# Patient Record
Sex: MALE | Race: WHITE | NOT HISPANIC OR LATINO | Employment: FULL TIME | RURAL
[De-identification: names, ages, dates, MRNs, and addresses within clinical notes are randomized per-mention and may not be internally consistent; named-entity substitution may affect disease eponyms.]

---

## 2021-06-30 ENCOUNTER — OFFICE VISIT (OUTPATIENT)
Dept: FAMILY MEDICINE CLINIC | Facility: CLINIC | Age: 51
End: 2021-06-30

## 2021-06-30 ENCOUNTER — HOSPITAL ENCOUNTER (OUTPATIENT)
Dept: GENERAL RADIOLOGY | Facility: HOSPITAL | Age: 51
Discharge: HOME | End: 2021-06-30
Admitting: NURSE PRACTITIONER

## 2021-06-30 VITALS
HEIGHT: 71 IN | TEMPERATURE: 97.3 F | HEART RATE: 69 BPM | RESPIRATION RATE: 16 BRPM | OXYGEN SATURATION: 96 % | DIASTOLIC BLOOD PRESSURE: 84 MMHG | SYSTOLIC BLOOD PRESSURE: 138 MMHG | WEIGHT: 235.2 LBS | BODY MASS INDEX: 32.93 KG/M2

## 2021-06-30 DIAGNOSIS — R06.02 SHORTNESS OF BREATH: ICD-10-CM

## 2021-06-30 DIAGNOSIS — R07.89 CHEST WALL PAIN: Primary | ICD-10-CM

## 2021-06-30 DIAGNOSIS — H60.331 ACUTE SWIMMER'S EAR OF RIGHT SIDE: ICD-10-CM

## 2021-06-30 DIAGNOSIS — R07.81 RIB PAIN ON RIGHT SIDE: ICD-10-CM

## 2021-06-30 DIAGNOSIS — R53.83 OTHER FATIGUE: ICD-10-CM

## 2021-06-30 DIAGNOSIS — C44.311 BASAL CELL CARCINOMA OF NOSE: ICD-10-CM

## 2021-06-30 DIAGNOSIS — R07.89 CHEST WALL PAIN: ICD-10-CM

## 2021-06-30 DIAGNOSIS — R10.11 RIGHT UPPER QUADRANT PAIN: ICD-10-CM

## 2021-06-30 PROBLEM — E78.5 HYPERLIPIDEMIA: Status: ACTIVE | Noted: 2021-06-30

## 2021-06-30 LAB
BILIRUB BLD-MCNC: NEGATIVE MG/DL
CLARITY, POC: CLEAR
COLOR UR: YELLOW
GLUCOSE UR STRIP-MCNC: NEGATIVE MG/DL
KETONES UR QL: NEGATIVE
LEUKOCYTE EST, POC: NEGATIVE
NITRITE UR-MCNC: NEGATIVE MG/ML
PH UR: 5 [PH] (ref 5–8)
PROT UR STRIP-MCNC: NEGATIVE MG/DL
RBC # UR STRIP: NEGATIVE /UL
SP GR UR: 1.03 (ref 1–1.03)
UROBILINOGEN UR QL: NORMAL

## 2021-06-30 PROCEDURE — 93000 ELECTROCARDIOGRAM COMPLETE: CPT | Performed by: NURSE PRACTITIONER

## 2021-06-30 PROCEDURE — 71101 X-RAY EXAM UNILAT RIBS/CHEST: CPT

## 2021-06-30 PROCEDURE — 99204 OFFICE O/P NEW MOD 45 MIN: CPT | Performed by: NURSE PRACTITIONER

## 2021-06-30 PROCEDURE — 81003 URINALYSIS AUTO W/O SCOPE: CPT | Performed by: NURSE PRACTITIONER

## 2021-06-30 RX ORDER — COLISTIN SULFATE, NEOMYCIN SULFATE, THONZONIUM BROMIDE AND HYDROCORTISONE ACETATE 3; 3.3; .5; 1 MG/ML; MG/ML; MG/ML; MG/ML
3 SUSPENSION AURICULAR (OTIC) 4 TIMES DAILY
Qty: 10 ML | Refills: 0 | Status: SHIPPED | OUTPATIENT
Start: 2021-06-30 | End: 2021-07-03

## 2021-06-30 RX ORDER — OMEPRAZOLE 40 MG/1
40 CAPSULE, DELAYED RELEASE ORAL DAILY
Qty: 30 CAPSULE | Refills: 0 | Status: SHIPPED | OUTPATIENT
Start: 2021-06-30 | End: 2021-07-22

## 2021-06-30 NOTE — PROGRESS NOTES
Chief Complaint  Chest Pain    Subjective          Mau Thompson presents to [facility] for     History of Present Illness      Patient reports 1 year of right lower rib cage pain.  He reports he has this when he exercises on a regular basis.  He notices it on the treadmill walking about a year ago.  Over the last few months his been almost a constant pain or pressure in the right lower rib cage.  He also feels a fullness in his epigastric area.  He has had no diaphoresis or radiation to his arms.  He does occasionally notice some shortness of breath.  He stopped smoking many years ago after being a smoker for 25 years.  He does not drink much alcohol except from the weekends.  He notices no correlation to food.    Patient has a history of degenerative disc disease lumbar.  He also has some cervical disc problems as well.  He has not been seen by provider in some time.    Secondarily has had some bleeding and irritation from his right ear canal on and off for the last week.  He has been swimming locally.    Patient had a basal cell cancer removed from his nose by a dermatologist in [place] approximately 1 month ago.  He says he has no follow-up.  We discussed routine follow-ups with dermatology.          Mau Thompson  has a past medical history of DDD (degenerative disc disease), cervical and DDD (degenerative disc disease), lumbar.      Review of Systems   Constitutional: Positive for fatigue.   HENT: Positive for ear discharge. Negative for ear pain, sinus pain and sore throat.    Eyes: Negative for pain.   Respiratory: Positive for shortness of breath. Negative for cough.    Cardiovascular: Positive for chest pain.   Gastrointestinal: Negative for abdominal pain, diarrhea and nausea.   Endocrine: Negative for polyuria.   Genitourinary: Negative for decreased urine volume and dysuria.   Musculoskeletal: Negative for arthralgias.   Skin: Negative for rash.   Allergic/Immunologic:  "Negative for environmental allergies.   Neurological: Negative for dizziness, numbness and headaches.   Hematological: Does not bruise/bleed easily.   Psychiatric/Behavioral: The patient is not nervous/anxious.         Objective       Current Outpatient Medications:   •  neomycin-colistin-hydrocortisone-thonzonium (Cortisporin-TC) 3.3-3-10-0.5 MG/ML otic suspension, Administer 3 drops to the right ear 4 (Four) Times a Day for 7 days., Disp: 10 mL, Rfl: 0  •  omeprazole (priLOSEC) 40 MG capsule, Take 1 capsule by mouth Daily., Disp: 30 capsule, Rfl: 0    Vital Signs:      /84 (BP Location: Left arm, Patient Position: Sitting, Cuff Size: Large Adult)   Pulse 69   Temp 97.3 °F (36.3 °C) (Infrared)   Resp 16   Ht 180.3 cm (71\")   Wt 107 kg (235 lb 3.2 oz)   SpO2 96%   BMI 32.80 kg/m²     Vitals:    06/30/21 0846   BP: 138/84   BP Location: Left arm   Patient Position: Sitting   Cuff Size: Large Adult   Pulse: 69   Resp: 16   Temp: 97.3 °F (36.3 °C)   TempSrc: Infrared   SpO2: 96%   Weight: 107 kg (235 lb 3.2 oz)   Height: 180.3 cm (71\")      Physical Exam  Vitals and nursing note reviewed.   Constitutional:       General: He is not in acute distress.     Appearance: Normal appearance. He is well-developed and normal weight.   HENT:      Head: Normocephalic and atraumatic.      Right Ear: Tympanic membrane and ear canal normal.      Left Ear: Tympanic membrane, ear canal and external ear normal.      Ears:      Comments: Scab and exudate internal canal.     Nose: Nose normal.      Mouth/Throat:      Mouth: Mucous membranes are moist.      Dentition: Normal dentition.      Tongue: No lesions.      Pharynx: Uvula midline.   Eyes:      Conjunctiva/sclera: Conjunctivae normal.      Pupils: Pupils are equal, round, and reactive to light.   Cardiovascular:      Rate and Rhythm: Regular rhythm.      Heart sounds: Normal heart sounds. No murmur heard.   No friction rub. No gallop.    Pulmonary:      Effort: " Pulmonary effort is normal.      Breath sounds: Normal breath sounds. No wheezing or rhonchi.   Abdominal:      General: Bowel sounds are normal. There is no distension.      Palpations: Abdomen is soft.      Tenderness: There is no abdominal tenderness. Negative signs include Centeno's sign.      Comments: Questionable mild liver enlargement   Musculoskeletal:         General: Normal range of motion.      Cervical back: Normal range of motion and neck supple.   Skin:     General: Skin is warm and dry.   Neurological:      General: No focal deficit present.      Mental Status: He is alert.   Psychiatric:         Mood and Affect: Mood normal.         Behavior: Behavior normal.        Result Review :            ECG 12 Lead    Date/Time: 6/30/2021 9:24 AM  Performed by: Sandra Sanchez APRN  Authorized by: Sandra Sanchez APRN   Comparison: not compared with previous ECG   Rhythm: sinus rhythm  Rate: normal  Conduction: conduction normal  ST Segments: ST segments normal  QRS axis: normal  Other: no other findings    Clinical impression: normal ECG             PHQ-9 Depression Screening  Little interest or pleasure in doing things? 0   Feeling down, depressed, or hopeless? 0   Trouble falling or staying asleep, or sleeping too much?     Feeling tired or having little energy?     Poor appetite or overeating?     Feeling bad about yourself - or that you are a failure or have let yourself or your family down?     Trouble concentrating on things, such as reading the newspaper or watching television?     Moving or speaking so slowly that other people could have noticed? Or the opposite - being so fidgety or restless that you have been moving around a lot more than usual?     Thoughts that you would be better off dead, or of hurting yourself in some way?     PHQ-9 Total Score 0   If you checked off any problems, how difficult have these problems made it for you to do your work, take care of things at home, or get along  with other people?             Assessment and Plan    Diagnoses and all orders for this visit:    1. Chest wall pain (Primary)  Comments:  X-ray today.  Discussed using omeprazole.  May need additional work-up follow-up 3 weeks.  EKG normal  Orders:  -     Comprehensive Metabolic Panel  -     XR Ribs Right With PA Chest; Future  -     ECG 12 Lead    2. Shortness of breath  Comments:  On exertion with fullness.  Consider hiatal hernia versus GERD  Orders:  -     Comprehensive Metabolic Panel  -     TSH  -     CBC & Differential  -     ECG 12 Lead    3. Other fatigue  Comments:  Labs today will call with results  Orders:  -     Comprehensive Metabolic Panel  -     TSH  -     CBC & Differential    4. Rib pain on right side  -     XR Ribs Right With PA Chest; Future    5. Basal cell carcinoma of nose  Comments:  Annual follow-up    6. Acute swimmer's ear of right side  Comments:  Recheck here 2 to 3 weeks    Other orders  -     Cancel: XR Ribs Bilateral 4+ View With PA Chest; Future  -     neomycin-colistin-hydrocortisone-thonzonium (Cortisporin-TC) 3.3-3-10-0.5 MG/ML otic suspension; Administer 3 drops to the right ear 4 (Four) Times a Day for 7 days.  Dispense: 10 mL; Refill: 0  -     omeprazole (priLOSEC) 40 MG capsule; Take 1 capsule by mouth Daily.  Dispense: 30 capsule; Refill: 0         Problem List Items Addressed This Visit        Active Problems    Basal cell carcinoma of nose      Other Visit Diagnoses     Chest wall pain    -  Primary    X-ray today.  Discussed using omeprazole.  May need additional work-up follow-up 3 weeks.  EKG normal    Relevant Orders    Comprehensive Metabolic Panel    XR Ribs Right With PA Chest    ECG 12 Lead (Completed)    Shortness of breath        On exertion with fullness.  Consider hiatal hernia versus GERD    Relevant Orders    Comprehensive Metabolic Panel    TSH    CBC & Differential    ECG 12 Lead (Completed)    Other fatigue        Labs today will call with results     Relevant Orders    Comprehensive Metabolic Panel    TSH    CBC & Differential    Rib pain on right side        Relevant Orders    XR Ribs Right With PA Chest    Acute swimmer's ear of right side        Recheck here 2 to 3 weeks          Follow Up   Return in about 19 days (around 7/19/2021) for Recheck  ear recheck, rib pain.  Patient was given instructions and counseling regarding his condition or for health maintenance advice. Please see specific information pulled into the AVS if appropriate.

## 2021-07-01 LAB
ALBUMIN SERPL-MCNC: 4.5 G/DL (ref 4–5)
ALBUMIN/GLOB SERPL: 1.8 {RATIO} (ref 1.2–2.2)
ALP SERPL-CCNC: 128 IU/L (ref 48–121)
ALT SERPL-CCNC: 33 IU/L (ref 0–44)
AST SERPL-CCNC: 21 IU/L (ref 0–40)
BASOPHILS # BLD AUTO: 0.1 X10E3/UL (ref 0–0.2)
BASOPHILS NFR BLD AUTO: 2 %
BILIRUB SERPL-MCNC: 0.3 MG/DL (ref 0–1.2)
BUN SERPL-MCNC: 15 MG/DL (ref 6–24)
BUN/CREAT SERPL: 16 (ref 9–20)
CALCIUM SERPL-MCNC: 9 MG/DL (ref 8.7–10.2)
CHLORIDE SERPL-SCNC: 107 MMOL/L (ref 96–106)
CO2 SERPL-SCNC: 22 MMOL/L (ref 20–29)
CREAT SERPL-MCNC: 0.92 MG/DL (ref 0.76–1.27)
EOSINOPHIL # BLD AUTO: 0.2 X10E3/UL (ref 0–0.4)
EOSINOPHIL NFR BLD AUTO: 3 %
ERYTHROCYTE [DISTWIDTH] IN BLOOD BY AUTOMATED COUNT: 12.9 % (ref 11.6–15.4)
GLOBULIN SER CALC-MCNC: 2.5 G/DL (ref 1.5–4.5)
GLUCOSE SERPL-MCNC: 108 MG/DL (ref 65–99)
HCT VFR BLD AUTO: 47.9 % (ref 37.5–51)
HGB BLD-MCNC: 16.2 G/DL (ref 13–17.7)
IMM GRANULOCYTES # BLD AUTO: 0 X10E3/UL (ref 0–0.1)
IMM GRANULOCYTES NFR BLD AUTO: 0 %
LYMPHOCYTES # BLD AUTO: 1.8 X10E3/UL (ref 0.7–3.1)
LYMPHOCYTES NFR BLD AUTO: 29 %
MCH RBC QN AUTO: 30.6 PG (ref 26.6–33)
MCHC RBC AUTO-ENTMCNC: 33.8 G/DL (ref 31.5–35.7)
MCV RBC AUTO: 90 FL (ref 79–97)
MONOCYTES # BLD AUTO: 0.7 X10E3/UL (ref 0.1–0.9)
MONOCYTES NFR BLD AUTO: 10 %
NEUTROPHILS # BLD AUTO: 3.6 X10E3/UL (ref 1.4–7)
NEUTROPHILS NFR BLD AUTO: 56 %
PLATELET # BLD AUTO: 254 X10E3/UL (ref 150–450)
POTASSIUM SERPL-SCNC: 4.3 MMOL/L (ref 3.5–5.2)
PROT SERPL-MCNC: 7 G/DL (ref 6–8.5)
RBC # BLD AUTO: 5.3 X10E6/UL (ref 4.14–5.8)
SODIUM SERPL-SCNC: 142 MMOL/L (ref 134–144)
TSH SERPL DL<=0.005 MIU/L-ACNC: 2.6 UIU/ML (ref 0.45–4.5)
WBC # BLD AUTO: 6.4 X10E3/UL (ref 3.4–10.8)

## 2021-07-02 ENCOUNTER — TELEPHONE (OUTPATIENT)
Dept: FAMILY MEDICINE CLINIC | Facility: CLINIC | Age: 51
End: 2021-07-02

## 2021-07-02 LAB
BACTERIA UR CULT: NO GROWTH
BACTERIA UR CULT: NORMAL

## 2021-07-02 NOTE — TELEPHONE ENCOUNTER
Received a fax from pharmacy that the eardrops you sent for patient were too expensive and he was requesting an alternative.

## 2021-07-03 RX ORDER — OFLOXACIN 3 MG/ML
5 SOLUTION AURICULAR (OTIC) DAILY
Qty: 2 ML | Refills: 0 | Status: SHIPPED | OUTPATIENT
Start: 2021-07-03 | End: 2021-07-10

## 2021-07-08 ENCOUNTER — HOSPITAL ENCOUNTER (OUTPATIENT)
Dept: ULTRASOUND IMAGING | Facility: HOSPITAL | Age: 51
Discharge: HOME | End: 2021-07-08
Admitting: NURSE PRACTITIONER

## 2021-07-08 DIAGNOSIS — R10.11 RIGHT UPPER QUADRANT PAIN: ICD-10-CM

## 2021-07-08 PROCEDURE — 76705 ECHO EXAM OF ABDOMEN: CPT

## 2021-07-09 ENCOUNTER — TELEPHONE (OUTPATIENT)
Dept: FAMILY MEDICINE CLINIC | Facility: CLINIC | Age: 51
End: 2021-07-09

## 2021-07-09 DIAGNOSIS — K76.0 FATTY LIVER: ICD-10-CM

## 2021-07-09 DIAGNOSIS — R93.2 ABNORMAL GALLBLADDER ULTRASOUND: ICD-10-CM

## 2021-07-09 DIAGNOSIS — R10.11 RIGHT UPPER QUADRANT PAIN: Primary | ICD-10-CM

## 2021-07-09 DIAGNOSIS — R16.0 ENLARGED LIVER: ICD-10-CM

## 2021-07-12 NOTE — TELEPHONE ENCOUNTER
Please inform pt an enlarged and fatty liver.  Also there was some indication that the gallbladder wall was thickened.  There is no stone seen on the ultrasound.  Would like to do a CT scan of his abdomen and pelvis to follow-up on these findings as well as his complaint of pain in that area.  He has a follow-up with me already on July 21.  If you get these tests done we can make a decision about next steps.

## 2021-07-13 ENCOUNTER — TELEPHONE (OUTPATIENT)
Dept: FAMILY MEDICINE CLINIC | Facility: CLINIC | Age: 51
End: 2021-07-13

## 2021-07-13 NOTE — TELEPHONE ENCOUNTER
Patients spouse called requesting patient get scheduled for a CT as soon as possible. Patient is having increasing pain.

## 2021-07-14 PROBLEM — D31.30 NEVUS OF CHOROID: Status: ACTIVE | Noted: 2020-10-01

## 2021-07-21 ENCOUNTER — OFFICE VISIT (OUTPATIENT)
Dept: FAMILY MEDICINE CLINIC | Facility: CLINIC | Age: 51
End: 2021-07-21

## 2021-07-21 VITALS
TEMPERATURE: 97.9 F | SYSTOLIC BLOOD PRESSURE: 134 MMHG | DIASTOLIC BLOOD PRESSURE: 85 MMHG | BODY MASS INDEX: 32.93 KG/M2 | HEART RATE: 72 BPM | RESPIRATION RATE: 16 BRPM | HEIGHT: 71 IN | OXYGEN SATURATION: 98 % | WEIGHT: 235.2 LBS

## 2021-07-21 DIAGNOSIS — R93.2 ABNORMAL GALLBLADDER ULTRASOUND: ICD-10-CM

## 2021-07-21 DIAGNOSIS — R10.11 RIGHT UPPER QUADRANT PAIN: Primary | ICD-10-CM

## 2021-07-21 DIAGNOSIS — R16.0 ENLARGED LIVER: ICD-10-CM

## 2021-07-21 DIAGNOSIS — K76.0 FATTY LIVER: ICD-10-CM

## 2021-07-21 DIAGNOSIS — Z12.11 ENCOUNTER FOR SCREENING COLONOSCOPY: ICD-10-CM

## 2021-07-21 PROCEDURE — 99213 OFFICE O/P EST LOW 20 MIN: CPT | Performed by: NURSE PRACTITIONER

## 2021-07-21 NOTE — PROGRESS NOTES
Chief Complaint  Abdominal Pain (Follow up) and Chest Pain    Subjective          Mau Thompson presents to [facility] for follow-up ED visit      History of Present Illness    Patient is here to follow-up after ED visit to [facility] on 7/13/2021 he establish care as a new patient on 6/30/2021.  He had ultrasound of the right upper quadrant which indicated some gallbladder wall thickening.  He had some mild elevation in alkaline phosphatase and fatty liver on ultrasound.  CT scan was ordered to investigate the gallbladder and refer for EGD and colonoscopy.  The intent was to consult with surgery as well.  However patient went to the emergency room with increasing pain and nausea on 7/13/2021.    Previously he had been given omeprazole reports that that never helped any of his pain or problem.  Chest x-ray was normal.  In [facility] ED he had a CT scan of the abdomen and pelvis which is normal.  He also had a CT scan of chest after an elevated D-dimer.  She also had normal labs and EKGs in the emergency room.    He is here with his wife possibly wanting to follow-up with someone in [place] for the EGD and colonoscopy.  We had a discussion about the gallbladder wall and following up with the surgeon as well.  They will discuss it and call back tomorrow.    Mau Thompson  has a past medical history of DDD (degenerative disc disease), cervical and DDD (degenerative disc disease), lumbar.      Review of Systems   Constitutional: Positive for fatigue.   HENT: Negative for ear discharge, ear pain, sinus pain and sore throat.    Eyes: Negative for pain.   Respiratory: Negative for cough and shortness of breath.    Cardiovascular: Positive for chest pain.   Gastrointestinal: Positive for abdominal pain and nausea. Negative for diarrhea.   Endocrine: Negative for polyuria.   Genitourinary: Negative for decreased urine volume and dysuria.   Musculoskeletal: Negative for arthralgias.   Skin:  "Negative for rash.   Allergic/Immunologic: Negative for environmental allergies.   Neurological: Negative for dizziness, numbness and headaches.   Hematological: Does not bruise/bleed easily.   Psychiatric/Behavioral: The patient is not nervous/anxious.         Objective       Current Outpatient Medications:   •  omeprazole (priLOSEC) 40 MG capsule, Take 1 capsule by mouth Daily., Disp: 30 capsule, Rfl: 0    Vital Signs:      /85 (BP Location: Right arm, Patient Position: Sitting, Cuff Size: Large Adult)   Pulse 72   Temp 97.9 °F (36.6 °C) (Infrared)   Resp 16   Ht 180.3 cm (71\")   Wt 107 kg (235 lb 3.2 oz)   SpO2 98%   BMI 32.80 kg/m²     Vitals:    07/21/21 1616   BP: 134/85   BP Location: Right arm   Patient Position: Sitting   Cuff Size: Large Adult   Pulse: 72   Resp: 16   Temp: 97.9 °F (36.6 °C)   TempSrc: Infrared   SpO2: 98%   Weight: 107 kg (235 lb 3.2 oz)   Height: 180.3 cm (71\")      Physical Exam  Vitals and nursing note reviewed.   Constitutional:       Appearance: He is well-developed.   Cardiovascular:      Rate and Rhythm: Normal rate and regular rhythm.      Heart sounds: Normal heart sounds. No murmur heard.   No friction rub. No gallop.    Pulmonary:      Effort: Pulmonary effort is normal.      Breath sounds: Normal breath sounds. No wheezing or rales.   Abdominal:      General: Bowel sounds are normal.      Palpations: Abdomen is soft.      Tenderness: There is no abdominal tenderness.   Skin:     General: Skin is warm and dry.   Neurological:      Mental Status: He is alert.        Result Review :                PHQ-9 Depression Screening  Little interest or pleasure in doing things?     Feeling down, depressed, or hopeless?     Trouble falling or staying asleep, or sleeping too much?     Feeling tired or having little energy?     Poor appetite or overeating?     Feeling bad about yourself - or that you are a failure or have let yourself or your family down?     Trouble " concentrating on things, such as reading the newspaper or watching television?     Moving or speaking so slowly that other people could have noticed? Or the opposite - being so fidgety or restless that you have been moving around a lot more than usual?     Thoughts that you would be better off dead, or of hurting yourself in some way?     PHQ-9 Total Score     If you checked off any problems, how difficult have these problems made it for you to do your work, take care of things at home, or get along with other people?             Assessment and Plan    Diagnoses and all orders for this visit:    1. Right upper quadrant pain (Primary)  -     Ambulatory referral for Screening EGD    2. Abnormal gallbladder ultrasound  -     Ambulatory Referral to General Surgery    3. Enlarged liver  -     Ambulatory referral for Screening EGD    4. Fatty liver    5. Encounter for screening colonoscopy  -     Ambulatory Referral For Screening Colonoscopy         Problem List Items Addressed This Visit     None      Visit Diagnoses     Right upper quadrant pain    -  Primary    Relevant Orders    Ambulatory referral for Screening EGD    Abnormal gallbladder ultrasound        Relevant Orders    Ambulatory Referral to General Surgery    Enlarged liver        Relevant Orders    Ambulatory referral for Screening EGD    Fatty liver        Encounter for screening colonoscopy        Relevant Orders    Ambulatory Referral For Screening Colonoscopy          Follow Up   No follow-ups on file.  Patient was given instructions and counseling regarding his condition or for health maintenance advice. Please see specific information pulled into the AVS if appropriate.

## 2021-07-22 RX ORDER — OMEPRAZOLE 40 MG/1
CAPSULE, DELAYED RELEASE ORAL
Qty: 30 CAPSULE | Refills: 0 | Status: SHIPPED | OUTPATIENT
Start: 2021-07-22 | End: 2021-08-18 | Stop reason: SDUPTHER

## 2021-07-26 ENCOUNTER — TELEPHONE (OUTPATIENT)
Dept: FAMILY MEDICINE CLINIC | Facility: CLINIC | Age: 51
End: 2021-07-26

## 2021-07-26 NOTE — TELEPHONE ENCOUNTER
Yes I would continue that medication until he is seen by the gastroenterologist and had the scopes done.

## 2021-07-26 NOTE — TELEPHONE ENCOUNTER
Caller: VINNY OLMEDO    Relationship: Emergency Contact    Best call back number: [phone] (M)    What medications are you currently taking:   Current Outpatient Medications on File Prior to Visit   Medication Sig Dispense Refill   • omeprazole (priLOSEC) 40 MG capsule TAKE 1 CAPSULE BY MOUTH EVERY DAY 30 capsule 0     No current facility-administered medications on file prior to visit.        When did you start taking these medications:     Which medication are you concerned about: omeprazole (priLOSEC) 40 MG capsule    Who prescribed you this medication: FERDINAND SUAREZ    What are your concerns:  PATIENT WIFE IS INQUIRING IF PATIENT SHOULD CONTINUE MEDICATION WIFE STATES RECIEVED A NOTIFICATION THAT A REFILL WAS READY

## 2021-08-02 ENCOUNTER — TELEPHONE (OUTPATIENT)
Dept: FAMILY MEDICINE CLINIC | Facility: CLINIC | Age: 51
End: 2021-08-02

## 2021-08-02 NOTE — TELEPHONE ENCOUNTER
LVM for Leona to call back concerning  Referrals that were ordered on Mau. Need to see whom they would want to see so I can get the referrals sent to the appropriate offices. Need GI and General Surgery.

## 2021-08-03 ENCOUNTER — OFFICE (AMBULATORY)
Dept: URBAN - METROPOLITAN AREA CLINIC 64 | Facility: CLINIC | Age: 51
End: 2021-08-03

## 2021-08-03 VITALS
HEIGHT: 71 IN | DIASTOLIC BLOOD PRESSURE: 87 MMHG | HEART RATE: 67 BPM | SYSTOLIC BLOOD PRESSURE: 129 MMHG | WEIGHT: 235 LBS

## 2021-08-03 DIAGNOSIS — R10.11 RIGHT UPPER QUADRANT PAIN: ICD-10-CM

## 2021-08-03 DIAGNOSIS — R10.31 RIGHT LOWER QUADRANT PAIN: ICD-10-CM

## 2021-08-03 PROCEDURE — 99244 OFF/OP CNSLTJ NEW/EST MOD 40: CPT | Performed by: INTERNAL MEDICINE

## 2021-08-03 RX ORDER — CELECOXIB 200 MG/1
CAPSULE ORAL
Qty: 15 | Refills: 1 | Status: COMPLETED
Start: 2021-08-03 | End: 2021-08-24

## 2021-08-04 NOTE — TELEPHONE ENCOUNTER
Leona called back and stated that they did want to see a surgeon concerning the gallbladder issue and they did not have a preference just whomever Sandra would recommend. I sent referral to Gen Surg in [place] to contact and schedule patient.

## 2021-08-18 ENCOUNTER — OFFICE VISIT (OUTPATIENT)
Dept: SURGERY | Facility: CLINIC | Age: 51
End: 2021-08-18

## 2021-08-18 VITALS
BODY MASS INDEX: 32.76 KG/M2 | OXYGEN SATURATION: 94 % | WEIGHT: 234 LBS | SYSTOLIC BLOOD PRESSURE: 127 MMHG | DIASTOLIC BLOOD PRESSURE: 88 MMHG | HEART RATE: 75 BPM | TEMPERATURE: 98.4 F | HEIGHT: 71 IN

## 2021-08-18 DIAGNOSIS — R10.11 ABDOMINAL DISCOMFORT IN RIGHT UPPER QUADRANT: Primary | ICD-10-CM

## 2021-08-18 PROCEDURE — 99203 OFFICE O/P NEW LOW 30 MIN: CPT | Performed by: SURGERY

## 2021-08-18 RX ORDER — OMEPRAZOLE 40 MG/1
CAPSULE, DELAYED RELEASE ORAL
Qty: 30 CAPSULE | Refills: 0 | Status: SHIPPED | OUTPATIENT
Start: 2021-08-18 | End: 2021-09-09

## 2021-08-18 NOTE — PROGRESS NOTES
Subjective   Mau Thompson is a 51 y.o. male.     History of present illness  Mr. Thompson is a pleasant 51-year-old seen in the office today at the request of his primary care providers for right upper quadrant discomfort without radiation.  Patient's been having symptoms in the right upper quadrant for the past year.  He has been evaluated by Dr. Lamar at [facility] who plans an EGD and colonoscopy this next week.  Part of his work-up included a gallbladder ultrasound and a CT scan.  Both of those show an enlarged liver with thickened gallbladder wall.  We discussed with him that he may have chronic cholecystitis as part of his symptom complex.  However he has no loose stool.  He does feel bloated after eating and does have discomfort at times after eating in the right upper quadrant.    Complete review of systems is done and unremarkable with exception the chief complaint    Past Medical History:   Diagnosis Date   • DDD (degenerative disc disease), cervical    • DDD (degenerative disc disease), lumbar        Past Surgical History:   Procedure Laterality Date   • ANKLE SURGERY Left 2015   • BACK SURGERY  2004    L-5   • BACK SURGERY  2009    L-4       Outpatient Encounter Medications as of 8/18/2021   Medication Sig Dispense Refill   • omeprazole (priLOSEC) 40 MG capsule TAKE 1 CAPSULE BY MOUTH EVERY DAY 30 capsule 0   • [DISCONTINUED] omeprazole (priLOSEC) 40 MG capsule TAKE 1 CAPSULE BY MOUTH EVERY DAY 30 capsule 0     No facility-administered encounter medications on file as of 8/18/2021.       Allergies   Allergen Reactions   • Tetanus Toxoid Shortness Of Breath and Itching       Family History   Problem Relation Age of Onset   • Cancer Mother    • Stroke Father        Social History     Socioeconomic History   • Marital status:      Spouse name: Not on file   • Number of children: Not on file   • Years of education: Not on file   • Highest education level: Not on file   Tobacco Use   • Smoking status: Former  Smoker     Quit date: 2015     Years since quittin.5   • Smokeless tobacco: Never Used   Vaping Use   • Vaping Use: Never used   Substance and Sexual Activity   • Alcohol use: Yes     Comment: social   • Drug use: Never   • Sexual activity: Defer       The following portions of the patient's history were reviewed and updated as appropriate: allergies, current medications, past family history, past medical history, past social history, past surgical history and problem list.    Objective   Physical exam shows pleasant 51-year-old male.  HEENT is negative.  Heart regular.  Lungs are clear.  Abdomen is soft nontender without mass.  Extremities with equal range of motion and usage.  Neuro with no obvious focal deficit.    Impression: Right upper quadrant discomfort.    I personally reviewed his ultrasound and CT and laboratory studies.  He does have an enlarged liver.    Recommendation: I have suggested that he call us if his symptoms persist after next week's EGD and colonoscopy.  If they do then we would consider a lap ashley.  I told him its about 80% effective in cases such as his with chronic inflammation.    Assessment/Plan   There are no diagnoses linked to this encounter.               Iban Oconnell DO  2021  15:49 EDT

## 2021-08-25 ENCOUNTER — OFFICE (AMBULATORY)
Dept: URBAN - METROPOLITAN AREA PATHOLOGY 4 | Facility: PATHOLOGY | Age: 51
End: 2021-08-25

## 2021-08-25 ENCOUNTER — ON CAMPUS - OUTPATIENT (AMBULATORY)
Dept: URBAN - METROPOLITAN AREA HOSPITAL 2 | Facility: HOSPITAL | Age: 51
End: 2021-08-25
Payer: COMMERCIAL

## 2021-08-25 VITALS
DIASTOLIC BLOOD PRESSURE: 77 MMHG | DIASTOLIC BLOOD PRESSURE: 75 MMHG | HEART RATE: 76 BPM | HEART RATE: 89 BPM | SYSTOLIC BLOOD PRESSURE: 121 MMHG | DIASTOLIC BLOOD PRESSURE: 73 MMHG | DIASTOLIC BLOOD PRESSURE: 83 MMHG | OXYGEN SATURATION: 98 % | OXYGEN SATURATION: 97 % | DIASTOLIC BLOOD PRESSURE: 81 MMHG | DIASTOLIC BLOOD PRESSURE: 72 MMHG | SYSTOLIC BLOOD PRESSURE: 124 MMHG | SYSTOLIC BLOOD PRESSURE: 135 MMHG | HEART RATE: 82 BPM | DIASTOLIC BLOOD PRESSURE: 94 MMHG | HEART RATE: 87 BPM | RESPIRATION RATE: 16 BRPM | HEART RATE: 86 BPM | SYSTOLIC BLOOD PRESSURE: 112 MMHG | SYSTOLIC BLOOD PRESSURE: 123 MMHG | HEART RATE: 79 BPM | OXYGEN SATURATION: 99 % | DIASTOLIC BLOOD PRESSURE: 80 MMHG | HEART RATE: 71 BPM | SYSTOLIC BLOOD PRESSURE: 144 MMHG | SYSTOLIC BLOOD PRESSURE: 108 MMHG | DIASTOLIC BLOOD PRESSURE: 110 MMHG | TEMPERATURE: 97.6 F | SYSTOLIC BLOOD PRESSURE: 152 MMHG | RESPIRATION RATE: 18 BRPM | HEIGHT: 71 IN | DIASTOLIC BLOOD PRESSURE: 84 MMHG | HEART RATE: 83 BPM | SYSTOLIC BLOOD PRESSURE: 131 MMHG | SYSTOLIC BLOOD PRESSURE: 128 MMHG | WEIGHT: 230 LBS | OXYGEN SATURATION: 95 %

## 2021-08-25 DIAGNOSIS — K52.9 NONINFECTIVE GASTROENTERITIS AND COLITIS, UNSPECIFIED: ICD-10-CM

## 2021-08-25 DIAGNOSIS — Z12.11 ENCOUNTER FOR SCREENING FOR MALIGNANT NEOPLASM OF COLON: ICD-10-CM

## 2021-08-25 DIAGNOSIS — K44.9 DIAPHRAGMATIC HERNIA WITHOUT OBSTRUCTION OR GANGRENE: ICD-10-CM

## 2021-08-25 DIAGNOSIS — K64.0 FIRST DEGREE HEMORRHOIDS: ICD-10-CM

## 2021-08-25 DIAGNOSIS — D12.3 BENIGN NEOPLASM OF TRANSVERSE COLON: ICD-10-CM

## 2021-08-25 DIAGNOSIS — R10.11 RIGHT UPPER QUADRANT PAIN: ICD-10-CM

## 2021-08-25 PROBLEM — K31.89 OTHER DISEASES OF STOMACH AND DUODENUM: Status: ACTIVE | Noted: 2021-08-25

## 2021-08-25 PROBLEM — K63.89 OTHER SPECIFIED DISEASES OF INTESTINE: Status: ACTIVE | Noted: 2021-08-25

## 2021-08-25 PROBLEM — K63.5 POLYP OF COLON: Status: ACTIVE | Noted: 2021-08-25

## 2021-08-25 LAB
GI HISTOLOGY: A. SELECT: (no result)
GI HISTOLOGY: B. SELECT: (no result)
GI HISTOLOGY: C. SELECT: (no result)
GI HISTOLOGY: D. SELECT: (no result)
GI HISTOLOGY: E. UNSPECIFIED: (no result)
GI HISTOLOGY: F. SELECT: (no result)
GI HISTOLOGY: PDF REPORT: (no result)

## 2021-08-25 PROCEDURE — 43239 EGD BIOPSY SINGLE/MULTIPLE: CPT | Performed by: INTERNAL MEDICINE

## 2021-08-25 PROCEDURE — 45380 COLONOSCOPY AND BIOPSY: CPT | Mod: 33,59 | Performed by: INTERNAL MEDICINE

## 2021-08-25 PROCEDURE — 45385 COLONOSCOPY W/LESION REMOVAL: CPT | Mod: 33 | Performed by: INTERNAL MEDICINE

## 2021-08-25 PROCEDURE — 88305 TISSUE EXAM BY PATHOLOGIST: CPT | Performed by: INTERNAL MEDICINE

## 2021-08-25 NOTE — SERVICEHPINOTES
ALMAZ FAROOQ  is a  51  male   who presents today for a  EGD-Colonoscopy   for   the indications listed below. The updated Patient Profile was reviewed prior to the procedure, in conjunction with the Physical Exam, including medical conditions, surgical procedures, medications, allergies, family history and social history. See Physical Exam time stamp below for date and time of HPI completion.Pre-operatively, I reviewed the indication(s) for the procedure, the risks of the procedure [including but not limited to: unexpected bleeding possibly requiring hospitalization and/or unplanned repeat procedures, perforation possibly requiring surgical treatment, missed lesions and complications of sedation/MAC (also explained by anesthesia staff)]. I have evaluated the patient for risks associated with the planned anesthesia and the procedure to be performed and find the patient an acceptable candidate for IV sedation.Multiple opportunities were provided for any questions or concerns, and all questions were answered satisfactorily before any anesthesia was administered. We will proceed with the planned procedure.BR

## 2021-08-30 PROBLEM — D36.9 TUBULAR ADENOMA: Status: ACTIVE | Noted: 2021-08-25

## 2021-08-31 ENCOUNTER — TELEPHONE (OUTPATIENT)
Dept: SURGERY | Facility: CLINIC | Age: 51
End: 2021-08-31

## 2021-08-31 ENCOUNTER — PREP FOR SURGERY (OUTPATIENT)
Dept: OTHER | Facility: HOSPITAL | Age: 51
End: 2021-08-31

## 2021-08-31 DIAGNOSIS — R10.11 RIGHT UPPER QUADRANT PAIN: Primary | ICD-10-CM

## 2021-08-31 RX ORDER — SODIUM CHLORIDE 9 MG/ML
100 INJECTION, SOLUTION INTRAVENOUS CONTINUOUS
Status: CANCELLED | OUTPATIENT
Start: 2021-08-31

## 2021-08-31 NOTE — H&P
Subjective   Mau Thompson is a 51 y.o. male.     History of present illness  Mr. Thompson is a pleasant 51-year-old seen in the office today at the request of his primary care providers for right upper quadrant discomfort without radiation.  Patient's been having symptoms in the right upper quadrant for the past year.  He has been evaluated by Dr. Lamar at [facility] who plans an EGD and colonoscopy this next week.  Part of his work-up included a gallbladder ultrasound and a CT scan.  Both of those show an enlarged liver with thickened gallbladder wall.  We discussed with him that he may have chronic cholecystitis as part of his symptom complex.  However he has no loose stool.  He does feel bloated after eating and does have discomfort at times after eating in the right upper quadrant.    Complete review of systems is done and unremarkable with exception the chief complaint    Past Medical History:   Diagnosis Date   • DDD (degenerative disc disease), cervical    • DDD (degenerative disc disease), lumbar        Past Surgical History:   Procedure Laterality Date   • ANKLE SURGERY Left 2015   • BACK SURGERY  2004    L-5   • BACK SURGERY  2009    L-4       Outpatient Encounter Medications as of 8/31/2021   Medication Sig Dispense Refill   • omeprazole (priLOSEC) 40 MG capsule TAKE 1 CAPSULE BY MOUTH EVERY DAY 30 capsule 0   • [DISCONTINUED] omeprazole (priLOSEC) 40 MG capsule TAKE 1 CAPSULE BY MOUTH EVERY DAY 30 capsule 0     No facility-administered encounter medications on file as of 8/31/2021.       Allergies   Allergen Reactions   • Tetanus Toxoid Shortness Of Breath and Itching       Family History   Problem Relation Age of Onset   • Cancer Mother    • Stroke Father        Social History     Socioeconomic History   • Marital status:      Spouse name: Not on file   • Number of children: Not on file   • Years of education: Not on file   • Highest education level: Not on file   Tobacco Use   • Smoking status: Former  Smoker     Quit date: 2015     Years since quittin.5   • Smokeless tobacco: Never Used   Vaping Use   • Vaping Use: Never used   Substance and Sexual Activity   • Alcohol use: Yes     Comment: social   • Drug use: Never   • Sexual activity: Defer       The following portions of the patient's history were reviewed and updated as appropriate: allergies, current medications, past family history, past medical history, past social history, past surgical history and problem list.    Objective   Physical exam shows pleasant 51-year-old male.  HEENT is negative.  Heart regular.  Lungs are clear.  Abdomen is soft nontender without mass.  Extremities with equal range of motion and usage.  Neuro with no obvious focal deficit.    Impression: Right upper quadrant discomfort.    I personally reviewed his ultrasound and CT and laboratory studies.  He does have an enlarged liver.    Recommendation: I have suggested that he call us if his symptoms persist after next week's EGD and colonoscopy.  If they do then we would consider a lap ashley.  I told him its about 80% effective in cases such as his with chronic inflammation.    Assessment/Plan   There are no diagnoses linked to this encounter.               Iban Oconnell DO  2021  12:11 EDT

## 2021-08-31 NOTE — TELEPHONE ENCOUNTER
PATIENT CALLED AND WANTS TO PROCEED WITH FREDDY CASTELLON. PATIENT SAYS HE HAD HIS COLONOSCOPY AND EGD ON 8/25/21. DO YOU WANT PATIENT TO HAVE AN OFFICE APPOINTMENT WITH YOU OR JUST PUT IN ORDERS FOR SURGERY?

## 2021-09-09 RX ORDER — OMEPRAZOLE 40 MG/1
CAPSULE, DELAYED RELEASE ORAL
Qty: 30 CAPSULE | Refills: 0 | Status: SHIPPED | OUTPATIENT
Start: 2021-09-09 | End: 2023-02-14

## 2021-09-14 RX ORDER — MESALAMINE 1.2 G/1
2.4 TABLET, DELAYED RELEASE ORAL 2 TIMES DAILY
COMMUNITY
Start: 2021-09-01 | End: 2023-02-14

## 2021-09-14 RX ORDER — CELECOXIB 200 MG/1
CAPSULE ORAL
COMMUNITY
Start: 2021-08-03 | End: 2023-02-14

## 2021-09-17 ENCOUNTER — HOSPITAL ENCOUNTER (OUTPATIENT)
Dept: CARDIOLOGY | Facility: HOSPITAL | Age: 51
Discharge: HOME | End: 2021-09-17

## 2021-09-17 ENCOUNTER — LAB (OUTPATIENT)
Dept: LAB | Facility: HOSPITAL | Age: 51
End: 2021-09-17

## 2021-09-17 DIAGNOSIS — R10.11 RIGHT UPPER QUADRANT PAIN: ICD-10-CM

## 2021-09-17 LAB — QT INTERVAL: 396 MS

## 2021-09-17 PROCEDURE — 85025 COMPLETE CBC W/AUTO DIFF WBC: CPT

## 2021-09-17 PROCEDURE — 93005 ELECTROCARDIOGRAM TRACING: CPT | Performed by: SURGERY

## 2021-09-17 PROCEDURE — 80053 COMPREHEN METABOLIC PANEL: CPT

## 2021-09-17 PROCEDURE — 93010 ELECTROCARDIOGRAM REPORT: CPT | Performed by: INTERNAL MEDICINE

## 2021-09-17 PROCEDURE — 36415 COLL VENOUS BLD VENIPUNCTURE: CPT

## 2021-09-18 LAB
ALBUMIN SERPL-MCNC: 4.4 G/DL (ref 3.5–5.2)
ALBUMIN/GLOB SERPL: 1.8 G/DL
ALP SERPL-CCNC: 86 U/L (ref 39–117)
ALT SERPL W P-5'-P-CCNC: 33 U/L (ref 1–41)
ANION GAP SERPL CALCULATED.3IONS-SCNC: 7.6 MMOL/L (ref 5–15)
AST SERPL-CCNC: 19 U/L (ref 1–40)
BASOPHILS # BLD AUTO: 0.1 10*3/MM3 (ref 0–0.2)
BASOPHILS NFR BLD AUTO: 1.4 % (ref 0–1.5)
BILIRUB SERPL-MCNC: 0.2 MG/DL (ref 0–1.2)
BUN SERPL-MCNC: 15 MG/DL (ref 6–20)
BUN/CREAT SERPL: 14.6 (ref 7–25)
CALCIUM SPEC-SCNC: 8.9 MG/DL (ref 8.6–10.5)
CHLORIDE SERPL-SCNC: 108 MMOL/L (ref 98–107)
CO2 SERPL-SCNC: 23.4 MMOL/L (ref 22–29)
CREAT SERPL-MCNC: 1.03 MG/DL (ref 0.76–1.27)
DEPRECATED RDW RBC AUTO: 40.7 FL (ref 37–54)
EOSINOPHIL # BLD AUTO: 0.17 10*3/MM3 (ref 0–0.4)
EOSINOPHIL NFR BLD AUTO: 2.5 % (ref 0.3–6.2)
ERYTHROCYTE [DISTWIDTH] IN BLOOD BY AUTOMATED COUNT: 12.5 % (ref 12.3–15.4)
GFR SERPL CREATININE-BSD FRML MDRD: 77 ML/MIN/1.73
GLOBULIN UR ELPH-MCNC: 2.5 GM/DL
GLUCOSE SERPL-MCNC: 94 MG/DL (ref 65–99)
HCT VFR BLD AUTO: 44.9 % (ref 37.5–51)
HGB BLD-MCNC: 15 G/DL (ref 13–17.7)
IMM GRANULOCYTES # BLD AUTO: 0.02 10*3/MM3 (ref 0–0.05)
IMM GRANULOCYTES NFR BLD AUTO: 0.3 % (ref 0–0.5)
LYMPHOCYTES # BLD AUTO: 2.27 10*3/MM3 (ref 0.7–3.1)
LYMPHOCYTES NFR BLD AUTO: 32.9 % (ref 19.6–45.3)
MCH RBC QN AUTO: 30.1 PG (ref 26.6–33)
MCHC RBC AUTO-ENTMCNC: 33.4 G/DL (ref 31.5–35.7)
MCV RBC AUTO: 90.2 FL (ref 79–97)
MONOCYTES # BLD AUTO: 0.7 10*3/MM3 (ref 0.1–0.9)
MONOCYTES NFR BLD AUTO: 10.1 % (ref 5–12)
NEUTROPHILS NFR BLD AUTO: 3.65 10*3/MM3 (ref 1.7–7)
NEUTROPHILS NFR BLD AUTO: 52.8 % (ref 42.7–76)
NRBC BLD AUTO-RTO: 0 /100 WBC (ref 0–0.2)
PLATELET # BLD AUTO: 245 10*3/MM3 (ref 140–450)
PMV BLD AUTO: 10.1 FL (ref 6–12)
POTASSIUM SERPL-SCNC: 4.3 MMOL/L (ref 3.5–5.2)
PROT SERPL-MCNC: 6.9 G/DL (ref 6–8.5)
RBC # BLD AUTO: 4.98 10*6/MM3 (ref 4.14–5.8)
SODIUM SERPL-SCNC: 139 MMOL/L (ref 136–145)
WBC # BLD AUTO: 6.91 10*3/MM3 (ref 3.4–10.8)

## 2021-09-24 ENCOUNTER — LAB (OUTPATIENT)
Dept: LAB | Facility: HOSPITAL | Age: 51
End: 2021-09-24

## 2021-09-24 LAB — SARS-COV-2 ORF1AB RESP QL NAA+PROBE: NOT DETECTED

## 2021-09-24 PROCEDURE — U0004 COV-19 TEST NON-CDC HGH THRU: HCPCS

## 2021-09-24 PROCEDURE — C9803 HOPD COVID-19 SPEC COLLECT: HCPCS

## 2021-09-27 ENCOUNTER — HOSPITAL ENCOUNTER (OUTPATIENT)
Facility: HOSPITAL | Age: 51
Setting detail: HOSPITAL OUTPATIENT SURGERY
Discharge: HOME | End: 2021-09-27
Attending: SURGERY | Admitting: SURGERY

## 2021-09-27 ENCOUNTER — ANESTHESIA EVENT (OUTPATIENT)
Dept: PERIOP | Facility: HOSPITAL | Age: 51
End: 2021-09-27

## 2021-09-27 ENCOUNTER — ANESTHESIA (OUTPATIENT)
Dept: PERIOP | Facility: HOSPITAL | Age: 51
End: 2021-09-27

## 2021-09-27 VITALS
BODY MASS INDEX: 32.99 KG/M2 | HEART RATE: 70 BPM | SYSTOLIC BLOOD PRESSURE: 150 MMHG | OXYGEN SATURATION: 4 % | TEMPERATURE: 97.4 F | HEIGHT: 71 IN | WEIGHT: 235.67 LBS | RESPIRATION RATE: 13 BRPM | DIASTOLIC BLOOD PRESSURE: 90 MMHG

## 2021-09-27 DIAGNOSIS — R10.11 RIGHT UPPER QUADRANT PAIN: ICD-10-CM

## 2021-09-27 PROCEDURE — 25010000002 HEPARIN (PORCINE) PER 1000 UNITS: Performed by: SURGERY

## 2021-09-27 PROCEDURE — 47562 LAPAROSCOPIC CHOLECYSTECTOMY: CPT | Performed by: SURGERY

## 2021-09-27 PROCEDURE — 25010000002 PROPOFOL 10 MG/ML EMULSION

## 2021-09-27 PROCEDURE — 25010000002 MIDAZOLAM PER 1 MG

## 2021-09-27 PROCEDURE — 25010000002 FENTANYL CITRATE (PF) 100 MCG/2ML SOLUTION

## 2021-09-27 PROCEDURE — 88304 TISSUE EXAM BY PATHOLOGIST: CPT | Performed by: SURGERY

## 2021-09-27 PROCEDURE — 25010000002 DEXAMETHASONE PER 1 MG

## 2021-09-27 PROCEDURE — 47562 LAPAROSCOPIC CHOLECYSTECTOMY: CPT | Performed by: REGISTERED NURSE

## 2021-09-27 PROCEDURE — 25010000002 HYDRALAZINE PER 20 MG: Performed by: ANESTHESIOLOGY

## 2021-09-27 PROCEDURE — 25010000002 ONDANSETRON PER 1 MG

## 2021-09-27 PROCEDURE — C1889 IMPLANT/INSERT DEVICE, NOC: HCPCS | Performed by: SURGERY

## 2021-09-27 DEVICE — LIGACLIP 10-M/L, 10MM ENDOSCOPIC ROTATING MULTIPLE CLIP APPLIERS
Type: IMPLANTABLE DEVICE | Site: ABDOMEN | Status: FUNCTIONAL
Brand: LIGACLIP

## 2021-09-27 RX ORDER — PROMETHAZINE HYDROCHLORIDE 25 MG/1
25 TABLET ORAL ONCE AS NEEDED
Status: DISCONTINUED | OUTPATIENT
Start: 2021-09-27 | End: 2021-09-27 | Stop reason: HOSPADM

## 2021-09-27 RX ORDER — IPRATROPIUM BROMIDE AND ALBUTEROL SULFATE 2.5; .5 MG/3ML; MG/3ML
3 SOLUTION RESPIRATORY (INHALATION) ONCE AS NEEDED
Status: DISCONTINUED | OUTPATIENT
Start: 2021-09-27 | End: 2021-09-27 | Stop reason: HOSPADM

## 2021-09-27 RX ORDER — ACETAMINOPHEN 650 MG/1
650 SUPPOSITORY RECTAL ONCE AS NEEDED
Status: DISCONTINUED | OUTPATIENT
Start: 2021-09-27 | End: 2021-09-27 | Stop reason: HOSPADM

## 2021-09-27 RX ORDER — ROCURONIUM BROMIDE 10 MG/ML
INJECTION, SOLUTION INTRAVENOUS AS NEEDED
Status: DISCONTINUED | OUTPATIENT
Start: 2021-09-27 | End: 2021-09-27 | Stop reason: SURG

## 2021-09-27 RX ORDER — FENTANYL CITRATE 50 UG/ML
50 INJECTION, SOLUTION INTRAMUSCULAR; INTRAVENOUS
Status: DISCONTINUED | OUTPATIENT
Start: 2021-09-27 | End: 2021-09-27 | Stop reason: HOSPADM

## 2021-09-27 RX ORDER — SODIUM CHLORIDE 0.9 % (FLUSH) 0.9 %
10 SYRINGE (ML) INJECTION AS NEEDED
Status: DISCONTINUED | OUTPATIENT
Start: 2021-09-27 | End: 2021-09-27 | Stop reason: HOSPADM

## 2021-09-27 RX ORDER — LIDOCAINE HYDROCHLORIDE 20 MG/ML
INJECTION, SOLUTION EPIDURAL; INFILTRATION; INTRACAUDAL; PERINEURAL AS NEEDED
Status: DISCONTINUED | OUTPATIENT
Start: 2021-09-27 | End: 2021-09-27 | Stop reason: SURG

## 2021-09-27 RX ORDER — LABETALOL HYDROCHLORIDE 5 MG/ML
5 INJECTION, SOLUTION INTRAVENOUS
Status: DISCONTINUED | OUTPATIENT
Start: 2021-09-27 | End: 2021-09-27 | Stop reason: HOSPADM

## 2021-09-27 RX ORDER — NALOXONE HCL 0.4 MG/ML
0.4 VIAL (ML) INJECTION AS NEEDED
Status: DISCONTINUED | OUTPATIENT
Start: 2021-09-27 | End: 2021-09-27 | Stop reason: HOSPADM

## 2021-09-27 RX ORDER — MIDAZOLAM HYDROCHLORIDE 1 MG/ML
INJECTION INTRAMUSCULAR; INTRAVENOUS AS NEEDED
Status: DISCONTINUED | OUTPATIENT
Start: 2021-09-27 | End: 2021-09-27 | Stop reason: SURG

## 2021-09-27 RX ORDER — HYDRALAZINE HYDROCHLORIDE 20 MG/ML
10 INJECTION INTRAMUSCULAR; INTRAVENOUS ONCE
Status: COMPLETED | OUTPATIENT
Start: 2021-09-27 | End: 2021-09-27

## 2021-09-27 RX ORDER — EPHEDRINE SULFATE 50 MG/ML
5 INJECTION, SOLUTION INTRAVENOUS ONCE AS NEEDED
Status: DISCONTINUED | OUTPATIENT
Start: 2021-09-27 | End: 2021-09-27 | Stop reason: HOSPADM

## 2021-09-27 RX ORDER — ONDANSETRON 2 MG/ML
4 INJECTION INTRAMUSCULAR; INTRAVENOUS ONCE AS NEEDED
Status: DISCONTINUED | OUTPATIENT
Start: 2021-09-27 | End: 2021-09-27 | Stop reason: HOSPADM

## 2021-09-27 RX ORDER — DEXAMETHASONE SODIUM PHOSPHATE 4 MG/ML
8 INJECTION, SOLUTION INTRA-ARTICULAR; INTRALESIONAL; INTRAMUSCULAR; INTRAVENOUS; SOFT TISSUE ONCE AS NEEDED
Status: DISCONTINUED | OUTPATIENT
Start: 2021-09-27 | End: 2021-09-27 | Stop reason: HOSPADM

## 2021-09-27 RX ORDER — DIPHENHYDRAMINE HYDROCHLORIDE 50 MG/ML
12.5 INJECTION INTRAMUSCULAR; INTRAVENOUS
Status: DISCONTINUED | OUTPATIENT
Start: 2021-09-27 | End: 2021-09-27 | Stop reason: HOSPADM

## 2021-09-27 RX ORDER — ACETAMINOPHEN 325 MG/1
650 TABLET ORAL ONCE AS NEEDED
Status: DISCONTINUED | OUTPATIENT
Start: 2021-09-27 | End: 2021-09-27 | Stop reason: HOSPADM

## 2021-09-27 RX ORDER — DEXAMETHASONE SODIUM PHOSPHATE 4 MG/ML
INJECTION, SOLUTION INTRA-ARTICULAR; INTRALESIONAL; INTRAMUSCULAR; INTRAVENOUS; SOFT TISSUE AS NEEDED
Status: DISCONTINUED | OUTPATIENT
Start: 2021-09-27 | End: 2021-09-27 | Stop reason: SURG

## 2021-09-27 RX ORDER — LIDOCAINE HYDROCHLORIDE 10 MG/ML
0.5 INJECTION, SOLUTION INFILTRATION; PERINEURAL ONCE AS NEEDED
Status: DISCONTINUED | OUTPATIENT
Start: 2021-09-27 | End: 2021-09-27 | Stop reason: HOSPADM

## 2021-09-27 RX ORDER — FLUMAZENIL 0.1 MG/ML
0.2 INJECTION INTRAVENOUS AS NEEDED
Status: DISCONTINUED | OUTPATIENT
Start: 2021-09-27 | End: 2021-09-27 | Stop reason: HOSPADM

## 2021-09-27 RX ORDER — SODIUM CHLORIDE 9 MG/ML
100 INJECTION, SOLUTION INTRAVENOUS CONTINUOUS
Status: DISCONTINUED | OUTPATIENT
Start: 2021-09-27 | End: 2021-09-27 | Stop reason: HOSPADM

## 2021-09-27 RX ORDER — SODIUM CHLORIDE, SODIUM LACTATE, POTASSIUM CHLORIDE, CALCIUM CHLORIDE 600; 310; 30; 20 MG/100ML; MG/100ML; MG/100ML; MG/100ML
100 INJECTION, SOLUTION INTRAVENOUS CONTINUOUS
Status: DISCONTINUED | OUTPATIENT
Start: 2021-09-27 | End: 2021-09-27 | Stop reason: HOSPADM

## 2021-09-27 RX ORDER — ESMOLOL HYDROCHLORIDE 10 MG/ML
INJECTION INTRAVENOUS AS NEEDED
Status: DISCONTINUED | OUTPATIENT
Start: 2021-09-27 | End: 2021-09-27 | Stop reason: SURG

## 2021-09-27 RX ORDER — BUPIVACAINE HYDROCHLORIDE 2.5 MG/ML
INJECTION, SOLUTION EPIDURAL; INFILTRATION; INTRACAUDAL AS NEEDED
Status: DISCONTINUED | OUTPATIENT
Start: 2021-09-27 | End: 2021-09-27 | Stop reason: HOSPADM

## 2021-09-27 RX ORDER — PROPOFOL 10 MG/ML
VIAL (ML) INTRAVENOUS AS NEEDED
Status: DISCONTINUED | OUTPATIENT
Start: 2021-09-27 | End: 2021-09-27 | Stop reason: SURG

## 2021-09-27 RX ORDER — HYDROCODONE BITARTRATE AND ACETAMINOPHEN 7.5; 325 MG/1; MG/1
1 TABLET ORAL EVERY 6 HOURS PRN
Qty: 30 TABLET | Refills: 0 | Status: SHIPPED | OUTPATIENT
Start: 2021-09-27 | End: 2023-02-14

## 2021-09-27 RX ORDER — SODIUM CHLORIDE, SODIUM LACTATE, POTASSIUM CHLORIDE, CALCIUM CHLORIDE 600; 310; 30; 20 MG/100ML; MG/100ML; MG/100ML; MG/100ML
1000 INJECTION, SOLUTION INTRAVENOUS CONTINUOUS
Status: DISCONTINUED | OUTPATIENT
Start: 2021-09-27 | End: 2021-09-27 | Stop reason: HOSPADM

## 2021-09-27 RX ORDER — ONDANSETRON 2 MG/ML
INJECTION INTRAMUSCULAR; INTRAVENOUS AS NEEDED
Status: DISCONTINUED | OUTPATIENT
Start: 2021-09-27 | End: 2021-09-27 | Stop reason: SURG

## 2021-09-27 RX ORDER — FENTANYL CITRATE 50 UG/ML
25 INJECTION, SOLUTION INTRAMUSCULAR; INTRAVENOUS
Status: DISCONTINUED | OUTPATIENT
Start: 2021-09-27 | End: 2021-09-27 | Stop reason: HOSPADM

## 2021-09-27 RX ORDER — HYDRALAZINE HYDROCHLORIDE 20 MG/ML
5 INJECTION INTRAMUSCULAR; INTRAVENOUS
Status: DISCONTINUED | OUTPATIENT
Start: 2021-09-27 | End: 2021-09-27 | Stop reason: HOSPADM

## 2021-09-27 RX ORDER — FENTANYL CITRATE 50 UG/ML
INJECTION, SOLUTION INTRAMUSCULAR; INTRAVENOUS AS NEEDED
Status: DISCONTINUED | OUTPATIENT
Start: 2021-09-27 | End: 2021-09-27 | Stop reason: SURG

## 2021-09-27 RX ADMIN — FENTANYL CITRATE 50 MCG: 50 INJECTION, SOLUTION INTRAMUSCULAR; INTRAVENOUS at 11:14

## 2021-09-27 RX ADMIN — LIDOCAINE HYDROCHLORIDE 100 MG: 20 INJECTION, SOLUTION EPIDURAL; INFILTRATION; INTRACAUDAL; PERINEURAL at 10:45

## 2021-09-27 RX ADMIN — FENTANYL CITRATE 100 MCG: 50 INJECTION, SOLUTION INTRAMUSCULAR; INTRAVENOUS at 10:44

## 2021-09-27 RX ADMIN — ESMOLOL HYDROCHLORIDE 20 MG: 10 INJECTION, SOLUTION INTRAVENOUS at 11:19

## 2021-09-27 RX ADMIN — ROCURONIUM BROMIDE 50 MG: 10 INJECTION INTRAVENOUS at 10:45

## 2021-09-27 RX ADMIN — PROPOFOL 100 MG: 10 INJECTION, EMULSION INTRAVENOUS at 10:46

## 2021-09-27 RX ADMIN — CEFAZOLIN SODIUM 2 G: 1 INJECTION, POWDER, FOR SOLUTION INTRAMUSCULAR; INTRAVENOUS at 10:52

## 2021-09-27 RX ADMIN — PROPOFOL 30 MG: 10 INJECTION, EMULSION INTRAVENOUS at 11:07

## 2021-09-27 RX ADMIN — FENTANYL CITRATE 50 MCG: 50 INJECTION, SOLUTION INTRAMUSCULAR; INTRAVENOUS at 11:03

## 2021-09-27 RX ADMIN — HYDRALAZINE HYDROCHLORIDE 10 MG: 20 INJECTION INTRAMUSCULAR; INTRAVENOUS at 13:05

## 2021-09-27 RX ADMIN — ONDANSETRON 4 MG: 2 INJECTION INTRAMUSCULAR; INTRAVENOUS at 11:41

## 2021-09-27 RX ADMIN — DEXAMETHASONE SODIUM PHOSPHATE 4 MG: 4 INJECTION, SOLUTION INTRAMUSCULAR; INTRAVENOUS at 10:53

## 2021-09-27 RX ADMIN — SODIUM CHLORIDE, POTASSIUM CHLORIDE, SODIUM LACTATE AND CALCIUM CHLORIDE 1000 ML: 600; 310; 30; 20 INJECTION, SOLUTION INTRAVENOUS at 08:35

## 2021-09-27 RX ADMIN — PROPOFOL 200 MG: 10 INJECTION, EMULSION INTRAVENOUS at 10:45

## 2021-09-27 RX ADMIN — MIDAZOLAM 2 MG: 1 INJECTION INTRAMUSCULAR; INTRAVENOUS at 10:44

## 2021-09-27 RX ADMIN — ROCURONIUM BROMIDE 10 MG: 10 INJECTION INTRAVENOUS at 11:01

## 2021-09-27 NOTE — ANESTHESIA POSTPROCEDURE EVALUATION
Patient: Mau Thompson    Procedure Summary     Date: 09/27/21 Room / Location: [facility] OR  / [facility] MAIN OR    Anesthesia Start: 1042 Anesthesia Stop: 1153    Procedure: CHOLECYSTECTOMY LAPAROSCOPIC (N/A Abdomen) Diagnosis:       Right upper quadrant pain      (Right upper quadrant pain [R10.11])    Surgeons: Iban Oconnell DO Provider: Morgan Calvo MD    Anesthesia Type: general ASA Status: 3          Anesthesia Type: general    Vitals  Vitals Value Taken Time   /88 09/27/21 1233   Temp 97.6 °F (36.4 °C) 09/27/21 1235   Pulse 62 09/27/21 1237   Resp 12 09/27/21 1235   SpO2 96 % 09/27/21 1237   Vitals shown include unvalidated device data.        Post Anesthesia Care and Evaluation    Patient location during evaluation: PACU  Patient participation: complete - patient participated  Level of consciousness: awake  Pain scale: See nurse's notes for pain score.  Pain management: adequate  Airway patency: patent  Anesthetic complications: No anesthetic complications  PONV Status: none  Cardiovascular status: acceptable  Respiratory status: acceptable  Hydration status: acceptable    Comments: Patient seen and examined postoperatively; vital signs stable; SpO2 greater than or equal to 90%; cardiopulmonary status stable; nausea/vomiting adequately controlled; pain adequately controlled; no apparent anesthesia complications; patient discharged from anesthesia care when discharge criteria were met

## 2021-09-27 NOTE — ANESTHESIA PREPROCEDURE EVALUATION
Anesthesia Evaluation     Patient summary reviewed and Nursing notes reviewed   NPO Solid Status: > 8 hours  NPO Liquid Status: > 8 hours           Airway   Mallampati: II  TM distance: >3 FB  Neck ROM: full  No difficulty expected  Dental - normal exam     Pulmonary - negative pulmonary ROS and normal exam   Cardiovascular - normal exam    ECG reviewed    (+) hyperlipidemia,       Neuro/Psych- negative ROS  GI/Hepatic/Renal/Endo    (+) morbid obesity,      Musculoskeletal     Abdominal  - normal exam    Bowel sounds: normal.   Substance History - negative use     OB/GYN negative ob/gyn ROS         Other   arthritis,                    Anesthesia Plan    ASA 3     general     intravenous induction     Anesthetic plan, all risks, benefits, and alternatives have been provided, discussed and informed consent has been obtained with: patient.    Plan discussed with CRNA and CAA.

## 2021-09-27 NOTE — ANESTHESIA PROCEDURE NOTES
Airway  Urgency: elective    Date/Time: 9/27/2021 10:46 AM  End Time:9/27/2021 10:47 AM  Airway not difficult    General Information and Staff    Patient location during procedure: OR  Anesthesiologist: Morgan Calvo MD  CRNA: Santy Hough AA    Indications and Patient Condition  Indications for airway management: airway protection    Preoxygenated: yes  MILS not maintained throughout  Mask difficulty assessment: 3 - difficult mask (inadequate, unstable or two providers) +/- NMBA    Final Airway Details  Final airway type: endotracheal airway      Successful airway: ETT  Cuffed: yes   Successful intubation technique: direct laryngoscopy  Facilitating devices/methods: intubating stylet  Endotracheal tube insertion site: oral  Blade: Dawn  Blade size: 3  ETT size (mm): 7.0  Cormack-Lehane Classification: grade I - full view of glottis  Placement verified by: chest auscultation, capnometry and palpation of cuff   Measured from: gums  ETT/EBT to gums (cm): 21  Number of attempts at approach: 1  Assessment: lips, teeth, and gum same as pre-op and atraumatic intubation    Additional Comments  Intubation performed by Jerica FLORES under supervision of Dr. Calvo.

## 2021-09-27 NOTE — OP NOTE
CHOLECYSTECTOMY LAPAROSCOPIC  Procedure Report    Patient Name:  Mau Thompson  YOB: 1970    Date of Surgery:  9/27/2021     Indications: Chronic cholecystitis with right upper quadrant pain    Pre-op Diagnosis:   Right upper quadrant pain [R10.11]       Post-Op Diagnosis Codes:     * Right upper quadrant pain [R10.11]    Procedure/CPT® Codes:      Procedure(s):  CHOLECYSTECTOMY LAPAROSCOPIC    Staff:  Surgeon(s):  Iban Oconnell DO    Assistant: Cate Ulrich RNFA    Anesthesia: General    Anesthetist: Dr Calvo    Estimated Blood Loss: minimal    Implants:    Implant Name Type Inv. Item Serial No.  Lot No. LRB No. Used Action   CLIPAPPLR M/ ENDO LIGACLIP ROT 10MM MD/LESLIE - [id number] Implant CLIPAPPLR M/ ENDO LIGACLIP ROT 10MM MD/LESLIE  [employer]  DIV OF J AND J [id number] N/A 1 Implanted       Specimen:          Specimens     ID Source Type Tests Collected By Collected At Frozen?    A Gallbladder Tissue · TISSUE PATHOLOGY EXAM   Iban Oconnell DO 9/27/21 0946     This specimen was not marked as sent.              Findings: Gallbladder encased in fat    Complications: None    Description of Procedure: Mr. Thompson is a 51-year-old seen in the office with complaints of recurring right upper quadrant pain.  His gallbladder studies have been unremarkable but there is no other good reason for his discomfort.  That mimics gallbladder disease so we have recommended that he consider a lap ashley.  We have discussed the procedure and risks.  He understands those accepts those and is for that reason presents.    Patient was taken the operating room placed in the supine position.  General was done by Dr Calvo.  Timeout done identity verified.  Abdomen is prepped and draped after 3-minute drying time.  An infraumbilical incision is made and varies needle passed through all layers.  Saline drop test is done is negative and the abdomen insufflated with CO2 to approximately 15 mmHg pressure.   Disposable 11 mm trocar and sheath is passed and the laparoscope was introduced confirming intra-abdominal positioning with no injury.  Subxiphoid and 2 lateral ports were then placed under direct vision.  The gallbladder is grasped at its fundus and then the remainder of the gallbladder is encased in fat and omentum.  We gently used combination of Hydro dissection and blunt dissection to and cautery dissection to remove the fat down off of the gallbladder.  We are eventually able to find the end of the gallbladder and then we are able to identify cystic duct at its entrance into the gallbladder.  2 clips were then placed proximally to distally and the duct divided.  The artery was identified clipped and divided and the gallbladder then removed from the liver bed using J-hook electrocautery.  It was placed in an Endo Catch bag and retrieved out the subxiphoid incision.  Gallbladder fossa was then inspected it appeared to be hemostatic.  Due to some bleeders in the omentum when removing it we irrigated the abdominal cavity with 3 L of fluid and suctioned free.  There was no ongoing bleeding at the end of the procedure.  We then used a Elmed suture passer and a 0 Vicryl stitches placed through the fascia at both 11 mm port sites under direct vision.  All ports were then removed allowing CO2 to escape to the atmosphere proving no bleeding from the port sites themselves.  Fascial stitches were tied down and skin closed with 4-0 Vicryl throughout.  Sterile OpSite dressings were applied over Mastisol and Steri-Strips.  Final sponge instrument needle counts were correct.  He was awakened and transferred to recovery in satisfactory condition.    Assistant: Cate Ulrich RNFA  was responsible for performing the following activities: Retraction, Suction, Irrigation, Suturing, Closing, Placing Dressing and Held/Positioned Camera and their skilled assistance was necessary for the success of this case.    Iban Oconnell DO      Date: 9/27/2021  Time: 11:42 EDT

## 2021-09-28 ENCOUNTER — TELEPHONE (OUTPATIENT)
Dept: SURGERY | Facility: CLINIC | Age: 51
End: 2021-09-28

## 2021-09-28 LAB
LAB AP CASE REPORT: NORMAL
PATH REPORT.FINAL DX SPEC: NORMAL
PATH REPORT.GROSS SPEC: NORMAL

## 2021-10-19 ENCOUNTER — OFFICE VISIT (OUTPATIENT)
Dept: SURGERY | Facility: CLINIC | Age: 51
End: 2021-10-19

## 2021-10-19 VITALS
SYSTOLIC BLOOD PRESSURE: 165 MMHG | HEART RATE: 76 BPM | OXYGEN SATURATION: 96 % | WEIGHT: 237 LBS | HEIGHT: 71 IN | TEMPERATURE: 96.8 F | DIASTOLIC BLOOD PRESSURE: 88 MMHG | BODY MASS INDEX: 33.18 KG/M2

## 2021-10-19 DIAGNOSIS — R10.11 ABDOMINAL DISCOMFORT IN RIGHT UPPER QUADRANT: Primary | ICD-10-CM

## 2021-10-19 PROCEDURE — 99024 POSTOP FOLLOW-UP VISIT: CPT | Performed by: SURGERY

## 2021-10-19 NOTE — PROGRESS NOTES
SUBJECTIVE:    Elder is seen in the office today follow-up from his lap ashley done couple weeks ago.  He is doing very well.  No fevers no chills no nausea or vomiting.    OBJECTIVE:    Incisions are healing appropriately without infection    ASSESSMENT:    Satisfactory postop progress    PLAN:    Recheck in the office as needed

## 2023-01-23 ENCOUNTER — TELEPHONE (OUTPATIENT)
Dept: FAMILY MEDICINE CLINIC | Facility: CLINIC | Age: 53
End: 2023-01-23
Payer: COMMERCIAL

## 2023-01-23 NOTE — TELEPHONE ENCOUNTER
Caller: VINNY OLMEDO    Relationship to patient: Emergency Contact    Best call back number: [phone]    Who is your current provider: FERDINAND SUAREZ     Who would you like your new provider to be: BLADE BRINK     What are your reasons for transferring care: PATIENT'S WIFE AND DAUGHTER ARE BOTH SEEING BLADE BRINK     Additional notes: PATIENT'S WIFE CALLED TO CHANGE HER 'S UPCOMING APPOINTMENT, SHE WANTS HIM TO SEE BLADE BRINK INSTEAD OF FERDINAND SUAREZ    REQUESTED CALLBACK

## 2023-02-14 ENCOUNTER — HOSPITAL ENCOUNTER (OUTPATIENT)
Dept: GENERAL RADIOLOGY | Facility: HOSPITAL | Age: 53
Discharge: HOME | End: 2023-02-14
Payer: COMMERCIAL

## 2023-02-14 ENCOUNTER — OFFICE VISIT (OUTPATIENT)
Dept: FAMILY MEDICINE CLINIC | Facility: CLINIC | Age: 53
End: 2023-02-14
Payer: COMMERCIAL

## 2023-02-14 VITALS
OXYGEN SATURATION: 96 % | HEART RATE: 78 BPM | WEIGHT: 213 LBS | HEIGHT: 71 IN | TEMPERATURE: 98.2 F | BODY MASS INDEX: 29.82 KG/M2 | DIASTOLIC BLOOD PRESSURE: 82 MMHG | RESPIRATION RATE: 16 BRPM | SYSTOLIC BLOOD PRESSURE: 132 MMHG

## 2023-02-14 DIAGNOSIS — C44.311 BASAL CELL CARCINOMA OF NOSE: ICD-10-CM

## 2023-02-14 DIAGNOSIS — Z12.11 SCREENING FOR COLON CANCER: ICD-10-CM

## 2023-02-14 DIAGNOSIS — Z00.00 ANNUAL PHYSICAL EXAM: Primary | ICD-10-CM

## 2023-02-14 DIAGNOSIS — Z12.5 SCREENING FOR PROSTATE CANCER: ICD-10-CM

## 2023-02-14 DIAGNOSIS — Z13.29 SCREENING FOR THYROID DISORDER: ICD-10-CM

## 2023-02-14 DIAGNOSIS — R03.0 ELEVATED BLOOD PRESSURE READING: ICD-10-CM

## 2023-02-14 DIAGNOSIS — H93.13 TINNITUS OF BOTH EARS: ICD-10-CM

## 2023-02-14 DIAGNOSIS — Z13.1 SCREENING FOR DIABETES MELLITUS: ICD-10-CM

## 2023-02-14 DIAGNOSIS — M25.60 LIMITED JOINT RANGE OF MOTION: ICD-10-CM

## 2023-02-14 DIAGNOSIS — E78.5 HYPERLIPIDEMIA, UNSPECIFIED HYPERLIPIDEMIA TYPE: ICD-10-CM

## 2023-02-14 LAB
BILIRUB BLD-MCNC: NEGATIVE MG/DL
CLARITY, POC: CLEAR
COLOR UR: YELLOW
GLUCOSE UR STRIP-MCNC: NEGATIVE MG/DL
KETONES UR QL: NEGATIVE
LEUKOCYTE EST, POC: NEGATIVE
NITRITE UR-MCNC: NEGATIVE MG/ML
PH UR: 5.5 [PH] (ref 5–8)
PROT UR STRIP-MCNC: NEGATIVE MG/DL
RBC # UR STRIP: NEGATIVE /UL
SP GR UR: 1.03 (ref 1–1.03)
UROBILINOGEN UR QL: NORMAL

## 2023-02-14 PROCEDURE — 99396 PREV VISIT EST AGE 40-64: CPT

## 2023-02-14 PROCEDURE — 73070 X-RAY EXAM OF ELBOW: CPT

## 2023-02-14 PROCEDURE — 81002 URINALYSIS NONAUTO W/O SCOPE: CPT

## 2023-02-14 PROCEDURE — 99213 OFFICE O/P EST LOW 20 MIN: CPT

## 2023-02-14 NOTE — PROGRESS NOTES
Subjective   Mau Thompson is a 52 y.o. male. Presents to [facility]        Chief Complaint   Patient presents with   • Annual Exam       History of Present Illness  Patient is here to establish care with a provider.  He was previously seen by Sandra Sanchez and requests provider change.  He is concerned regarding stroke history. His father was 67 when he had a stroke, passed away at 73.      Patient presents to establish care with new provider.  Has been established with other provider in this office and well managed.  Here for medication management  of the below conditions, routine lab examination and medication refills.     Preventive Care:  Routine dental exams are completed by Dr. Jerson Fan.   Ophthalmology exam completed by Dr. Springer. Wears cheaters  Lung cancer screening - History of smoking 25 years, 1/2 ppd. Quit eight years ago.   Colon cancer - Colonoscopy 2021, next due 2026  Prostate Cancer Screening - Never completed.  PSA ordered today  Immunizations given today:     Anxiety/Depression: No     Healthy Habits:  Diet:  Exercise:  He wears his seatlbelt regularly.   He sleeps approximately six to eight hours per night. He does not have sleep apnea.      Discussed injury prevention, diet and exercise, safe sexual practices, and screening for common diseases. Encouraged use of sunscreen and seatbelts. Avoidance of tobacco encouraged. Limitation or avoidance of alcohol encouraged.     Anticipatory guidance given and routine screenings recommended with recommendations of yearly dental and eye exams., monitoring of blood pressure, lipids, and screening for colon and lung cancer risks.      Recent lab results if available were discussed as well as medications reconciled.  Patient agrees with plan of care and will return as needed.  All questions answered patient agrees with plan of care.  He agrees to return for annual physical exam.        Review of Systems:  Review of Systems    Constitutional: Negative for activity change, appetite change, chills, fever, unexpected weight gain and unexpected weight loss.   HENT: Negative.    Eyes: Positive for visual disturbance. Photophobia: cheaters.   Respiratory: Negative for cough, shortness of breath and wheezing.    Cardiovascular: Negative for chest pain, palpitations and leg swelling.   Gastrointestinal: Negative for abdominal distention, abdominal pain, anal bleeding, blood in stool, constipation, diarrhea, nausea, vomiting, GERD and indigestion.   Genitourinary: Negative for difficulty urinating, dysuria, flank pain, nocturia and erectile dysfunction.   Musculoskeletal:        Right elbow joint does not fully extend to straighten arm all the way out.    Allergic/Immunologic: Negative for environmental allergies and food allergies.   Neurological: Negative for dizziness, weakness, headache and confusion.   Psychiatric/Behavioral: Negative for self-injury, sleep disturbance, suicidal ideas, depressed mood and stress. The patient is not nervous/anxious.          I personally reviewed and updated the patient's allergies, medications, problem list, and past medical, surgical, social, and family history. I have reviewed and confirmed the accuracy of the History of Present Illness and Review of Symptoms as documented by the MA/LPN/RN. Iliana Haynes, APRN     Allergies:  Allergies   Allergen Reactions   • Tetanus Toxoid Shortness Of Breath and Itching       Social History:  Social History     Socioeconomic History   • Marital status:    Tobacco Use   • Smoking status: Former     Packs/day: 0.50     Years: 20.00     Pack years: 10.00     Types: Cigarettes     Quit date: 2015     Years since quittin.0   • Smokeless tobacco: Never   Vaping Use   • Vaping Use: Never used   Substance and Sexual Activity   • Alcohol use: Yes     Alcohol/week: 6.0 standard drinks     Types: 6 Cans of beer per week     Comment: social   • Drug use: Never    • Sexual activity: Yes     Partners: Female     Birth control/protection: Vasectomy       Family History:  Family History   Problem Relation Age of Onset   • Cancer Mother         Triple X negative breast cancer   • Stroke Father        Past Medical History :  Patient Active Problem List   Diagnosis   • Cervical spondylosis   • Hyperlipidemia   • Basal cell carcinoma of nose   • Nevus of choroid   • Abdominal discomfort in right upper quadrant   • Tubular adenoma       Medication List:  No current outpatient medications on file.    Past Surgical History:  Past Surgical History:   Procedure Laterality Date   • ANKLE SURGERY Left 2015   • BACK SURGERY  2004    L-5   • BACK SURGERY  2009    L-4   • CHOLECYSTECTOMY N/A 09/27/2021    Procedure: CHOLECYSTECTOMY LAPAROSCOPIC;  Surgeon: Iban Oconnell DO;  Location: [facility] MAIN OR;  Service: General;  Laterality: N/A;   • FRACTURE SURGERY  2015    Left ankle - 3 pins   • SPINE SURGERY  2003 and 2008    L4, L5         Physical Exam:  Physical Exam  Vitals and nursing note reviewed.   Constitutional:       General: He is not in acute distress.     Appearance: Normal appearance. He is well-groomed and normal weight.   HENT:      Head: Normocephalic and atraumatic.      Right Ear: Ear canal and external ear normal. There is no impacted cerumen.      Left Ear: Ear canal and external ear normal. There is no impacted cerumen. Tympanic membrane is scarred.      Nose: Nose normal.      Mouth/Throat:      Lips: Pink.      Mouth: Mucous membranes are moist.   Eyes:      General: Lids are normal. Vision grossly intact.      Extraocular Movements: Extraocular movements intact.      Conjunctiva/sclera: Conjunctivae normal.      Pupils: Pupils are equal, round, and reactive to light.   Neck:      Thyroid: No thyroid mass, thyromegaly or thyroid tenderness.      Vascular: No carotid bruit.   Cardiovascular:      Rate and Rhythm: Normal rate and regular rhythm.      Pulses: Normal  "pulses.      Heart sounds: Normal heart sounds.   Pulmonary:      Effort: Pulmonary effort is normal.      Breath sounds: Normal breath sounds.   Abdominal:      General: Abdomen is flat. Bowel sounds are normal.      Palpations: Abdomen is soft. Abdomen is not rigid.      Tenderness: There is no right CVA tenderness or left CVA tenderness.   Musculoskeletal:      Right elbow: Deformity present. No swelling, effusion or lacerations. Decreased range of motion. No tenderness.      Left elbow: Normal.      Cervical back: Full passive range of motion without pain, normal range of motion and neck supple.      Right lower leg: No edema.      Left lower leg: No edema.   Skin:     General: Skin is warm and dry.      Capillary Refill: Capillary refill takes less than 2 seconds.   Neurological:      General: No focal deficit present.      Mental Status: He is alert and oriented to person, place, and time. Mental status is at baseline.   Psychiatric:         Attention and Perception: Attention and perception normal.         Mood and Affect: Mood and affect normal. Mood is not depressed.         Speech: Speech normal.         Behavior: Behavior normal. Behavior is cooperative.         Thought Content: Thought content normal.          Vital Signs:  Vital Signs:  /82 (BP Location: Right arm, Patient Position: Sitting, Cuff Size: Large Adult)   Pulse 78   Temp 98.2 °F (36.8 °C) (Infrared)   Resp 16   Ht 180.3 cm (71\")   Wt 96.6 kg (213 lb)   SpO2 96%   BMI 29.71 kg/m²   Vitals:    02/14/23 0800   BP: 132/82   BP Location: Right arm   Patient Position: Sitting   Cuff Size: Large Adult   Pulse: 78   Resp: 16   Temp: 98.2 °F (36.8 °C)   TempSrc: Infrared   SpO2: 96%   Weight: 96.6 kg (213 lb)   Height: 180.3 cm (71\")        Estimated body mass index is 29.71 kg/m² as calculated from the following:    Height as of this encounter: 180.3 cm (71\").    Weight as of this encounter: 96.6 kg (213 lb).    Result Review :        "           PHQ-9 Total Score: 0                 Assessment and Plan   Problems Addressed this Visit        Cardiac and Vasculature    Hyperlipidemia    Relevant Orders    CBC & Differential    Comprehensive Metabolic Panel    Lipid Panel    TSH    POC Urinalysis Dipstick       Hematology and Neoplasia    Basal cell carcinoma of nose   Other Visit Diagnoses     Annual physical exam    -  Primary    Feels well, minimal complaints.    Elevated blood pressure reading        Limited joint range of motion        Relevant Orders    XR Elbow 2 View Right    Screening for diabetes mellitus        Relevant Orders    Hemoglobin A1c    Screening for colon cancer        Screening for prostate cancer        Relevant Orders    PSA SCREENING    Screening for thyroid disorder        Relevant Orders    TSH    Tinnitus of both ears        Advanced ENT and Allergy. Chronic.  H/O injury to left ear.       Diagnoses       Codes Comments    Annual physical exam    -  Primary ICD-10-CM: Z00.00  ICD-9-CM: V70.0 Feels well, minimal complaints.    Elevated blood pressure reading     ICD-10-CM: R03.0  ICD-9-CM: 796.2     Hyperlipidemia, unspecified hyperlipidemia type     ICD-10-CM: E78.5  ICD-9-CM: 272.4     Limited joint range of motion     ICD-10-CM: M25.60  ICD-9-CM: 719.50     Screening for diabetes mellitus     ICD-10-CM: Z13.1  ICD-9-CM: V77.1     Screening for colon cancer     ICD-10-CM: Z12.11  ICD-9-CM: V76.51     Basal cell carcinoma of nose     ICD-10-CM: C44.311  ICD-9-CM: 173.31 Sees Apolinar Gomez on [address]. Advised to go yearly.     Screening for prostate cancer     ICD-10-CM: Z12.5  ICD-9-CM: V76.44     Screening for thyroid disorder     ICD-10-CM: Z13.29  ICD-9-CM: V77.0     Tinnitus of both ears     ICD-10-CM: H93.13  ICD-9-CM: 388.30 Advanced ENT and Allergy. Chronic.  H/O injury to left ear.           BMI is >= 30 and <35. (Class 1 Obesity). The following options were offered after discussion;: exercise  counseling/recommendations and nutrition counseling/recommendations      Diagnoses and all orders for this visit:    1. Annual physical exam (Primary)  Comments:  Feels well, minimal complaints.    2. Elevated blood pressure reading    3. Hyperlipidemia, unspecified hyperlipidemia type  -     CBC & Differential  -     Comprehensive Metabolic Panel  -     Lipid Panel  -     TSH  -     POC Urinalysis Dipstick    4. Limited joint range of motion  -     XR Elbow 2 View Right    5. Screening for diabetes mellitus  -     Hemoglobin A1c    6. Screening for colon cancer    7. Basal cell carcinoma of nose  Comments:  Sees Apolinar Gomez on [address]. Advised to go yearly.     8. Screening for prostate cancer  -     PSA SCREENING    9. Screening for thyroid disorder  -     TSH    10. Tinnitus of both ears  Comments:  Advanced ENT and Allergy. Chronic.  H/O injury to left ear.          Follow Up   Return in about 1 year (around 2/14/2024) for Next scheduled follow up.  Patient was given instructions and counseling regarding his condition or for health maintenance advice. Please see specific information pulled into the AVS if appropriate.    There are no Patient Instructions on file for this visit.     II wore protective equipment throughout this patient encounter to include mask. Hand hygiene was performed during entrance to exam room and following assessment of patient.     This document is intended for medical expert use only. Reading of this document by patients and/or patient's family without participating medical staff guidance may result in misinterpretation and unintended morbidity. Any interpretation of such data is the responsibility of the patient and/or family member responsible for the patient in concert with their primary or specialist providers, not to be left for sources of online searches such as [employer], [employer] or similar queries. Relying on these approaches to knowledge may result in misinterpretation,  misguided goals of care and even death should patients or family members try recommendations outside of the realm of professional medical care.

## 2025-05-12 ENCOUNTER — OFFICE VISIT (OUTPATIENT)
Dept: FAMILY MEDICINE CLINIC | Facility: CLINIC | Age: 55
End: 2025-05-12
Payer: COMMERCIAL

## 2025-05-12 VITALS
DIASTOLIC BLOOD PRESSURE: 81 MMHG | SYSTOLIC BLOOD PRESSURE: 124 MMHG | HEIGHT: 71 IN | BODY MASS INDEX: 31.02 KG/M2 | WEIGHT: 221.6 LBS | HEART RATE: 76 BPM | TEMPERATURE: 97.3 F | OXYGEN SATURATION: 95 % | RESPIRATION RATE: 18 BRPM

## 2025-05-12 DIAGNOSIS — R42 LIGHTHEADED: ICD-10-CM

## 2025-05-12 DIAGNOSIS — Z13.29 SCREENING FOR THYROID DISORDER: ICD-10-CM

## 2025-05-12 DIAGNOSIS — H93.13 TINNITUS AURIUM, BILATERAL: ICD-10-CM

## 2025-05-12 DIAGNOSIS — Z00.00 ANNUAL PHYSICAL EXAM: Primary | ICD-10-CM

## 2025-05-12 DIAGNOSIS — R35.0 FREQUENT URINATION: ICD-10-CM

## 2025-05-12 DIAGNOSIS — Z12.5 SCREENING FOR PROSTATE CANCER: ICD-10-CM

## 2025-05-12 DIAGNOSIS — Z13.1 SCREENING FOR DIABETES MELLITUS: ICD-10-CM

## 2025-05-12 DIAGNOSIS — E78.5 HYPERLIPIDEMIA, UNSPECIFIED HYPERLIPIDEMIA TYPE: ICD-10-CM

## 2025-05-12 DIAGNOSIS — K21.9 GASTROESOPHAGEAL REFLUX DISEASE, UNSPECIFIED WHETHER ESOPHAGITIS PRESENT: ICD-10-CM

## 2025-05-12 LAB
BILIRUB BLD-MCNC: NEGATIVE MG/DL
CLARITY, POC: CLEAR
COLOR UR: YELLOW
GLUCOSE UR STRIP-MCNC: NEGATIVE MG/DL
KETONES UR QL: NEGATIVE
LEUKOCYTE EST, POC: NEGATIVE
NITRITE UR-MCNC: NEGATIVE MG/ML
PH UR: 6 [PH] (ref 5–8)
PROT UR STRIP-MCNC: NEGATIVE MG/DL
RBC # UR STRIP: NEGATIVE /UL
SP GR UR: 1.02 (ref 1–1.03)
UROBILINOGEN UR QL: NORMAL

## 2025-05-12 PROCEDURE — 99214 OFFICE O/P EST MOD 30 MIN: CPT

## 2025-05-12 PROCEDURE — 81003 URINALYSIS AUTO W/O SCOPE: CPT

## 2025-05-12 PROCEDURE — 99396 PREV VISIT EST AGE 40-64: CPT

## 2025-05-12 RX ORDER — OMEPRAZOLE 20 MG/1
20 CAPSULE, DELAYED RELEASE ORAL DAILY
Qty: 90 CAPSULE | Refills: 3 | Status: SHIPPED | OUTPATIENT
Start: 2025-05-12

## 2025-05-12 NOTE — PROGRESS NOTES
"      Office Note          Name: Mua Thompson    : 1970     MRN: [id number]     Chief Complaint  Annual Exam and Urinary Frequency    Subjective     Mau Thompson is a 55 y.o. male here for his annual wellness and physical exam. Patient is complaining of light headed, frequent urination, stomach issues    Mau is additionally here for coordination of medical care, to discuss health maintenance, disease prevention as well as to follow-up on medical problems.      Dizziness  Today's concern : During first hour he wakes up. Occasionally going from sitting position to standing.  .   Symptoms occur intermittently.   Pertinent negative symptoms include no chest pain, no cough, no nausea, no numbness, no dysuria and no vomiting.       Please update:      Preventive Care:  Routine dental exams are completed by Dr. Jerson Fan.   Ophthalmology exam completed by Dr. Springer. Wears cheaters    Lung cancer screening - History of smoking 25 years, 1/2 ppd. Quit eight years ago.     Anxiety/Depression: No      Healthy Habits:  Diet:  Exercise:  He wears his seatlbelt regularly.   He sleeps approximately six to eight hours per night. He does not have sleep apnea.      Preventive Health/Lifestyle:   General Health: poor  Nutrition: in general, a \"healthy\" diet    Activity level is moderate.   Exercises 1 per week.  Energy level is poor.  Sleep: poorly  Hours of Sleep: 6  Sleep Apnea or Snoring: Yes  Skin Self Exam: occasionally  Libido: poor    Last Physical Exam: 2023   Last tetanus shot was Resued  Prostate Cancer Screening:  Colonoscopy: Colonoscopy , next due   Last Completed Colonoscopy            Upcoming       COLORECTAL CANCER SCREENING (COLONOSCOPY - Every 5 Years) Next due on 2021  SCANNED - COLONOSCOPY                         Last colonoscopy was 2021 with Normal results repeat in 5 years    History of Present Illness  The patient is a 55-year-old male who presents for an " annual physical exam.    He reports experiencing intermittent dizziness and lightheadedness, which he attributes to his night shift work schedule at [employer]. His sleep pattern is irregular, often sleeping for approximately 6 hours during the day and taking a nap before his next shift. He works remotely 2 days a week and in the office for 3 days. He has been working on this project for 3 years and has 2 more years to go before he can return to day shifts. He typically consumes one meal per day, supplemented with snacks such as bananas and oranges throughout the night. His sleep schedule varies depending on whether he is working from home or in the office. When working from home, he goes to bed shortly after finishing work and usually sleeps from 9 AM to 3 PM. However, when he is in the office, he tends to stay up later. He notes that his dizziness and lightheadedness are more pronounced on days when he has not had sufficient sleep or when he is working the night shift. He describes a sensation of fogginess and feeling less than 100% on those days.    He reports no issues with urination, noting that his urine is typically dark yellow upon waking but becomes lighter and clearer throughout the day. He maintains hydration by consuming unsweetened tea, water, and black coffee without sugar or cream. He does not monitor his blood pressure regularly but notes that it has always been within the high normal range, around 130/81, as per his last DOT physical. He has not had an eye examination in several years, with the last one being approximately 4 to 5 years ago. He reports no environmental allergies or inner ear issues. He experiences tinnitus in both ears, which started about 2 years ago. He has seen an ENT specialist twice for this issue. He has noticed an increase in urination over the past 6 months, which he attributes to increased fluid intake. He reports no muscle aches, pains, arthritis, skin spots, moles, rashes,  stress, sleep issues, anxiety, depression, or thoughts of self-harm or harm to others. He reports no fainting episodes, head injuries, or chest pain. He admits to not drinking enough water throughout the night and reports snoring. He does not wake up with headaches in the morning and is not interested in a sleep study at this time.    He reports a sensation of gnawing in his stomach over the past few months, which he manages by eating something to calm it down. He occasionally takes Tums for relief but notes that the sensation is different from hunger. He does not experience this sensation daily but notes that it occurs several days a week. He reports no association with specific foods and notes that he can eat anything without exacerbating the sensation. He has not taken any over-the-counter medications besides Tums. He reports no history of ulcers and has undergone a colonoscopy in the past. He had his gallbladder removed in 2021. He does not take any daily medications, only a multivitamin. He reports no blood in his stool or urine, constipation, diarrhea, nausea, vomiting, or increased nighttime urination.    PAST SURGICAL HISTORY:  - Gallbladder removal in 2021    SOCIAL HISTORY  He works at [employer].    FAMILY HISTORY  His mother had triple negative breast cancer.  His father had a stroke at age 67, which led to paralysis on his right side and subsequent dementia. He lived for 6 years after the stroke and was bedridden after the first year.  His paternal grandfather had bone cancer and back issues.  His paternal grandmother was healthy and lived into her upper 90s.  He is unsure about his maternal grandparents' health history.  He reports no known family history of strokes, high blood pressure, hypertension, diabetes, or other cancers among his aunts, uncles, or cousins.      Recent Hospitalizations:  No hospitalization(s) within the last year..     I personally reviewed and updated the patient's allergies,  medications, problem list, and past medical, surgical, social, and family history.       Current Outpatient Medications:     omeprazole (priLOSEC) 20 MG capsule, Take 1 capsule by mouth Daily., Disp: 90 capsule, Rfl: 3      Allergies   Allergen Reactions    Tetanus Toxoid Shortness Of Breath and Itching       Immunization History   Administered Date(s) Administered    COVID-19 (MODERNA) 1st,2nd,3rd Dose Monovalent 09/18/2021, 11/14/2021    Td (TDVAX) 11/25/2008       Review of Systems   Constitutional: Negative.    HENT: Negative.     Eyes: Negative.    Respiratory:  Negative for cough, shortness of breath and wheezing.    Cardiovascular:  Negative for chest pain, palpitations and leg swelling.   Gastrointestinal:  Positive for GERD and indigestion. Negative for blood in stool, constipation, diarrhea, nausea and vomiting.   Genitourinary:  Positive for frequency and nocturia. Negative for decreased urine volume, difficulty urinating, dysuria, flank pain and urinary incontinence.   Musculoskeletal: Negative.    Skin: Negative.    Allergic/Immunologic: Positive for environmental allergies.   Neurological:  Positive for dizziness and light-headedness. Negative for syncope, numbness, headache and memory problem.   Hematological:  Does not bruise/bleed easily.   Psychiatric/Behavioral:  Negative for self-injury, sleep disturbance, suicidal ideas, depressed mood and stress. The patient is not nervous/anxious.           Past Medical History:   Diagnosis Date    DDD (degenerative disc disease), cervical     DDD (degenerative disc disease), lumbar        Past Surgical History:   Procedure Laterality Date    ANKLE SURGERY Left 2015    BACK SURGERY  2004    L-5    BACK SURGERY  2009    L-4    CHOLECYSTECTOMY N/A 09/27/2021    Procedure: CHOLECYSTECTOMY LAPAROSCOPIC;  Surgeon: Iban Oconnell DO;  Location: [facility] MAIN OR;  Service: General;  Laterality: N/A;    FRACTURE SURGERY  2015    Left ankle - 3 pins    SPINE SURGERY   "2003 and 2008    L4, L5       Family History   Problem Relation Age of Onset    Cancer Mother         Triple X negative breast cancer    Stroke Father         age at diagnosis 67. Passed away at age 72    Other (carotid artery disease) Father     Dementia Father     Bone cancer Paternal Grandfather        Social History     Socioeconomic History    Marital status:    Tobacco Use    Smoking status: Former     Current packs/day: 0.00     Average packs/day: 0.5 packs/day for 20.0 years (10.0 ttl pk-yrs)     Types: Cigarettes     Start date: 1/30/1995     Quit date: 1/30/2015     Years since quitting: 10.2    Smokeless tobacco: Never   Vaping Use    Vaping status: Never Used   Substance and Sexual Activity    Alcohol use: Yes     Alcohol/week: 6.0 standard drinks of alcohol     Types: 6 Cans of beer per week     Comment: social    Drug use: Never    Sexual activity: Yes     Partners: Female     Birth control/protection: Vasectomy       The ASCVD Risk score (Per STALLINGS, et al., 2019) failed to calculate for the following reasons:    Cannot find a previous HDL lab    Cannot find a previous total cholesterol lab    PHQ-2 Depression Screening      5/12/2025     2:23 PM   PHQ-2/PHQ-9 Depression Screening   Little interest or pleasure in doing things Not at all   Feeling down, depressed, or hopeless Not at all   How difficult have these problems made it for you to do your work, take care of things at home, or get along with other people? Not difficult at all       Fall Risk Screen:  SHARITAADI Fall Risk Assessment has not been completed.    Objective     Vital Signs:  /81 (BP Location: Left arm, Patient Position: Sitting, Cuff Size: Large Adult)   Pulse 76   Temp 97.3 °F (36.3 °C) (Temporal)   Resp 18   Ht 180.3 cm (70.98\")   Wt 101 kg (221 lb 9.6 oz)   SpO2 95%   BMI 30.92 kg/m²     BP Readings from Last 2 Encounters:   05/12/25 124/81   02/14/23 132/82       Wt Readings from Last 2 Encounters:   05/12/25 101 " kg (221 lb 9.6 oz)   02/14/23 96.6 kg (213 lb)                Physical Exam:  Physical Exam  Vitals and nursing note reviewed.   Constitutional:       General: He is not in acute distress.     Appearance: Normal appearance. He is well-groomed. He is not ill-appearing.   HENT:      Head: Normocephalic and atraumatic.      Jaw: There is normal jaw occlusion.      Right Ear: Hearing, tympanic membrane, ear canal and external ear normal.      Left Ear: Hearing, tympanic membrane, ear canal and external ear normal.      Nose: Nose normal.      Mouth/Throat:      Lips: Pink.      Mouth: Mucous membranes are moist.      Pharynx: Oropharynx is clear. Uvula midline.   Eyes:      General: Lids are normal. Vision grossly intact.      Extraocular Movements: Extraocular movements intact.      Conjunctiva/sclera: Conjunctivae normal.      Pupils: Pupils are equal, round, and reactive to light. Pupils are equal.   Neck:      Thyroid: No thyromegaly or thyroid tenderness.      Vascular: No carotid bruit.   Cardiovascular:      Rate and Rhythm: Normal rate and regular rhythm.      Pulses: Normal pulses.      Heart sounds: Normal heart sounds.   Pulmonary:      Effort: Pulmonary effort is normal.      Breath sounds: Normal breath sounds and air entry.   Abdominal:      General: Abdomen is flat. Bowel sounds are normal.      Palpations: Abdomen is soft. Abdomen is not rigid.      Tenderness: There is no abdominal tenderness. There is no right CVA tenderness or left CVA tenderness.   Genitourinary:     Comments: Deferred  Musculoskeletal:         General: Normal range of motion.      Cervical back: Full passive range of motion without pain, normal range of motion and neck supple.      Right lower leg: No edema.      Left lower leg: No edema.   Skin:     General: Skin is warm and dry.      Capillary Refill: Capillary refill takes less than 2 seconds.      Comments: Tanned skin   Neurological:      General: No focal deficit present.       Mental Status: He is alert and oriented to person, place, and time. Mental status is at baseline.   Psychiatric:         Attention and Perception: Attention and perception normal.         Mood and Affect: Mood and affect normal.         Speech: Speech normal.         Behavior: Behavior normal. Behavior is cooperative.         Thought Content: Thought content normal.       Physical Exam   Pelvic   Pelvic comments: Deferred      Physical Exam      Results:   Result Review :   Results          Labs:       Imaging:   No valid procedures specified.        Data reviewed:          Assessment / Plan        Procedures    Problems Addressed this Visit       Hyperlipidemia                     Relevant Orders    Lipid Panel With LDL / HDL Ratio     Other Visit Diagnoses         Annual physical exam    -  Primary    Relevant Orders    POCT urinalysis dipstick, multipro (Completed)    CBC & Differential    Comprehensive Metabolic Panel      Screening for prostate cancer        Relevant Orders    PSA Screen      Screening for thyroid disorder        Relevant Orders    TSH Rfx On Abnormal To Free T4      Tinnitus aurium, bilateral          Lightheaded          Frequent urination          Gastroesophageal reflux disease, unspecified whether esophagitis present        Relevant Medications    omeprazole (priLOSEC) 20 MG capsule      Screening for diabetes mellitus        Relevant Orders    Hemoglobin A1c          Diagnoses         Codes Comments      Annual physical exam    -  Primary ICD-10-CM: Z00.00  ICD-9-CM: V70.0       Hyperlipidemia, unspecified hyperlipidemia type     ICD-10-CM: E78.5  ICD-9-CM: 272.4       Screening for prostate cancer     ICD-10-CM: Z12.5  ICD-9-CM: V76.44       Screening for thyroid disorder     ICD-10-CM: Z13.29  ICD-9-CM: V77.0       Tinnitus aurium, bilateral     ICD-10-CM: H93.13  ICD-9-CM: 388.31       Lightheaded     ICD-10-CM: R42  ICD-9-CM: 780.4       Frequent urination     ICD-10-CM:  R35.0  ICD-9-CM: 788.41       Gastroesophageal reflux disease, unspecified whether esophagitis present     ICD-10-CM: K21.9  ICD-9-CM: 530.81       Screening for diabetes mellitus     ICD-10-CM: Z13.1  ICD-9-CM: V77.1              Assessment & Plan  Annual physical exam  Discussed injury prevention, diet and exercise, safe sexual practices, and screening for common diseases. Encouraged use of sunscreen and seatbelts. Avoidance of tobacco encouraged. Limitation or avoidance of alcohol encouraged.      Anticipatory guidance given and routine screenings recommended with recommendations of yearly dental and eye exams., monitoring of blood pressure, lipids, and screening for colon and lung cancer risks.     Orders:    POCT urinalysis dipstick, multipro    CBC & Differential    Comprehensive Metabolic Panel    Hyperlipidemia, unspecified hyperlipidemia type   Lipid levels will be checked.         Orders:    Lipid Panel With LDL / HDL Ratio    Screening for prostate cancer    Orders:    PSA Screen    Screening for thyroid disorder    Orders:    TSH Rfx On Abnormal To Free T4    Tinnitus aurium, bilateral  - He reports experiencing tinnitus in both ears, with the left ear being significantly affected since his 20s.  - He has been advised to follow up with an ENT specialist if symptoms persist or worsen.  - No recent changes in hearing noted.  - Previous ENT consultations mentioned.       Lightheaded  - The lightheadedness could be due to inadequate sleep or dehydration.  - He has been advised to ensure better sleep hygiene and to drink sufficient water.  - The potential role of diuretics like coffee and tea in causing dehydration has been discussed.  - A vision examination has been recommended to rule out any ocular causes of lightheadedness.       Frequent urination  - He reports frequent urination, especially after drinking fluids.  - This symptom will be monitored, and further evaluation will be considered based on the  results of the comprehensive blood work.  - No issues with urine stream or color reported.  - No associated pain or discomfort noted.       Gastroesophageal reflux disease, unspecified whether esophagitis present  - He reports a gnawing sensation in his stomach that is alleviated by eating.  - Omeprazole has been prescribed to manage this condition.  - If omeprazole proves ineffective, alternative treatments such as sucralfate or Carafate will be considered.  - No history of ulcers or other gastrointestinal issues reported.       Screening for diabetes mellitus    Orders:    Hemoglobin A1c      Assessment & Plan  1. Dizziness.  - The dizziness may be attributed to potential sleep apnea or dehydration. His consumption of diuretics such as coffee and tea could also contribute to this symptom.  - A comprehensive blood work will be conducted today to assess thyroid function, cholesterol levels, complete blood count, metabolic profile, PSA for prostate cancer screening, and diabetes screening.  - He has been advised to maintain adequate hydration by drinking plenty of water and to limit caffeine intake to no more than two servings per day.  - A vision examination has been recommended to rule out any ocular causes of dizziness.    2. Lightheadedness.  - The lightheadedness could be due to inadequate sleep or dehydration.  - He has been advised to ensure better sleep hygiene and to drink sufficient water.  - The potential role of diuretics like coffee and tea in causing dehydration has been discussed.  - A vision examination has been recommended to rule out any ocular causes of lightheadedness.    3. Gastroesophageal reflux disease (GERD).  - He reports a gnawing sensation in his stomach that is alleviated by eating.  - Omeprazole has been prescribed to manage this condition.  - If omeprazole proves ineffective, alternative treatments such as sucralfate or Carafate will be considered.  - No history of ulcers or other  gastrointestinal issues reported.    4. Tinnitus.  - He reports experiencing tinnitus in both ears, with the left ear being significantly affected since his 20s.  - He has been advised to follow up with an ENT specialist if symptoms persist or worsen.  - No recent changes in hearing noted.  - Previous ENT consultations mentioned.    5. Frequent urination.  - He reports frequent urination, especially after drinking fluids.  - This symptom will be monitored, and further evaluation will be considered based on the results of the comprehensive blood work.  - No issues with urine stream or color reported.  - No associated pain or discomfort noted.    6. Health Maintenance.  - He has been advised to engage in physical exercise for at least 150 minutes per week, abstain from smoking and alcohol consumption, and apply sunscreen regularly.  - A vision examination has been recommended.  - A comprehensive blood work will be conducted today to assess thyroid function, cholesterol levels, complete blood count, metabolic profile, PSA for prostate cancer screening, and diabetes screening.    Follow-up  The patient will follow up in 2 weeks.       Follow Up   Wrapup Tab  Return in about 2 weeks (around 5/26/2025) for Recheck 2 weeks - lightheaded, dizziness and GERD.    Recent lab results if available were discussed as well as medications reconciled.    All questions answered patient agrees with plan of care.    He agrees to return for next routine follow up or sooner as needed.     There are no Patient Instructions on file for this visit.   Patient was given instructions and counseling regarding his condition or for health maintenance advice. Please see specific information pulled into the AVS if appropriate.  Hand hygiene was performed during entrance to exam room and following assessment of patient. This document is intended for medical expert use only.     EMR Dragon/Transcription disclaimer:   Much of this encounter note is an  electronic transcription/translation of spoken language to printed text. The electronic translation of spoken language may permit erroneous, or at times, nonsensical words or phrases to be inadvertently transcribed.      MARCY St, FNP-C  [facility] [address]  [address]  [place] [address] [phone]    Patient or patient representative verbalized consent for the use of Ambient Listening during the visit with  MARCY St for chart documentation. 5/12/2025  14:27 EDT

## 2025-05-13 LAB
ALBUMIN SERPL-MCNC: 4.6 G/DL (ref 3.8–4.9)
ALP SERPL-CCNC: 99 IU/L (ref 44–121)
ALT SERPL-CCNC: 23 IU/L (ref 0–44)
AST SERPL-CCNC: 22 IU/L (ref 0–40)
BASOPHILS # BLD AUTO: 0.1 X10E3/UL (ref 0–0.2)
BASOPHILS NFR BLD AUTO: 1 %
BILIRUB SERPL-MCNC: 0.7 MG/DL (ref 0–1.2)
BUN SERPL-MCNC: 11 MG/DL (ref 6–24)
BUN/CREAT SERPL: 12 (ref 9–20)
CALCIUM SERPL-MCNC: 9.1 MG/DL (ref 8.7–10.2)
CHLORIDE SERPL-SCNC: 104 MMOL/L (ref 96–106)
CHOLEST SERPL-MCNC: 184 MG/DL (ref 100–199)
CO2 SERPL-SCNC: 22 MMOL/L (ref 20–29)
CREAT SERPL-MCNC: 0.92 MG/DL (ref 0.76–1.27)
EGFRCR SERPLBLD CKD-EPI 2021: 99 ML/MIN/1.73
EOSINOPHIL # BLD AUTO: 0.2 X10E3/UL (ref 0–0.4)
EOSINOPHIL NFR BLD AUTO: 2 %
ERYTHROCYTE [DISTWIDTH] IN BLOOD BY AUTOMATED COUNT: 12.6 % (ref 11.6–15.4)
GLOBULIN SER CALC-MCNC: 2.4 G/DL (ref 1.5–4.5)
GLUCOSE SERPL-MCNC: 102 MG/DL (ref 70–99)
HBA1C MFR BLD: 5.6 % (ref 4.8–5.6)
HCT VFR BLD AUTO: 50.1 % (ref 37.5–51)
HDLC SERPL-MCNC: 35 MG/DL
HGB BLD-MCNC: 16.4 G/DL (ref 13–17.7)
IMM GRANULOCYTES # BLD AUTO: 0 X10E3/UL (ref 0–0.1)
IMM GRANULOCYTES NFR BLD AUTO: 0 %
LDLC SERPL CALC-MCNC: 123 MG/DL (ref 0–99)
LDLC/HDLC SERPL: 3.5 RATIO (ref 0–3.6)
LYMPHOCYTES # BLD AUTO: 2.6 X10E3/UL (ref 0.7–3.1)
LYMPHOCYTES NFR BLD AUTO: 33 %
MCH RBC QN AUTO: 31.5 PG (ref 26.6–33)
MCHC RBC AUTO-ENTMCNC: 32.7 G/DL (ref 31.5–35.7)
MCV RBC AUTO: 96 FL (ref 79–97)
MONOCYTES # BLD AUTO: 0.8 X10E3/UL (ref 0.1–0.9)
MONOCYTES NFR BLD AUTO: 11 %
NEUTROPHILS # BLD AUTO: 4.2 X10E3/UL (ref 1.4–7)
NEUTROPHILS NFR BLD AUTO: 53 %
PLATELET # BLD AUTO: 256 X10E3/UL (ref 150–450)
POTASSIUM SERPL-SCNC: 4.2 MMOL/L (ref 3.5–5.2)
PROT SERPL-MCNC: 7 G/DL (ref 6–8.5)
PSA SERPL-MCNC: 0.8 NG/ML (ref 0–4)
RBC # BLD AUTO: 5.21 X10E6/UL (ref 4.14–5.8)
SODIUM SERPL-SCNC: 141 MMOL/L (ref 134–144)
TRIGL SERPL-MCNC: 143 MG/DL (ref 0–149)
TSH SERPL DL<=0.005 MIU/L-ACNC: 1.95 UIU/ML (ref 0.45–4.5)
VLDLC SERPL CALC-MCNC: 26 MG/DL (ref 5–40)
WBC # BLD AUTO: 7.9 X10E3/UL (ref 3.4–10.8)

## 2025-06-16 NOTE — PROGRESS NOTES
Chief Complaint  Primary Care Follow-Up (2 week follow up ), Dizziness, and Urinary Frequency    Subjective      History of Present Illness:  Mau Thompson is a 55 y.o. male who presents to [facility] for dizziness and abdominal pain.       Abdominal Pain  Chronicity:  New  Onset:  More than 1 month ago  Onset quality:  Gradual  Frequency:  Intermittently  Progression since onset:  Unchanged  Episode duration:  Hours  Pain location:  LLQ  Pain - numeric:  4/10  Pain quality:  Sharp  Associated symptoms: no constipation, no diarrhea, no fever, no nausea and no vomiting    Dizziness  Symptoms include abdominal pain.    Pertinent negative symptoms include no chills, no fatigue, no fever, no nausea and no vomiting.       History of Present Illness  The patient is a 55-year-old male who presents for a follow-up of dizziness.    He reports occasional episodes of dizziness, particularly when transitioning from a seated to standing position upon waking. He attributes this to sleep schedule due to his work. Blood pressure is well controlled at home, and he does not report low blood pressures. He does not report any fainting or falls. He does not monitor his blood pressure at home but notes that it has consistently been in the range of 128 to 136 systolic over 80 diastolic during his 30-year tenure with the [facility]. He does not take aspirin daily. He does not report any memory issues or personality changes. He also reports tinnitus in both ears. He has increased his water intake, which he believes has improved his stomach condition. He does not report any visual disturbances, sinus issues, allergies, blackouts, or substance use disorder.    He has been taking omeprazole for abdominal discomfort, which he reports as unchanged. He has reduced his consumption of coffee and tea, which he believes has improved his condition. He experiences lower abdominal pain, which he thinks is related to his  previous high intake of coffee and tea. He does not report any specific food irritants, diarrhea, blood in stools, constipation, fevers, chills, fatigue, nausea, or vomiting. He underwent a colonoscopy in 2021, during which one polyp was detected in the hepatic flexure. He has had cholecystectomy.    He is currently on a regimen of omeprazole, multivitamins, and fish oil. He is monitoring his cholesterol levels.    PAST SURGICAL HISTORY:  - Colonoscopy in 2021: One polyp detected in the hepatic flexure  - Cholecystectomy    SOCIAL HISTORY  He does not report any smoking, alcohol intake, or drug use.    FAMILY HISTORY  His father had a stroke and carotid artery disease.    Mau Thompson  has a past medical history of DDD (degenerative disc disease), cervical and DDD (degenerative disc disease), lumbar.      Allergies   Allergen Reactions    Tetanus Toxoid Shortness Of Breath and Itching       Social History     Socioeconomic History    Marital status:    Tobacco Use    Smoking status: Former     Current packs/day: 0.00     Average packs/day: 0.5 packs/day for 20.0 years (10.0 ttl pk-yrs)     Types: Cigarettes     Start date: 1/30/1995     Quit date: 1/30/2015     Years since quitting: 10.3    Smokeless tobacco: Never   Vaping Use    Vaping status: Never Used   Substance and Sexual Activity    Alcohol use: Yes     Alcohol/week: 6.0 standard drinks of alcohol     Types: 6 Cans of beer per week     Comment: social    Drug use: Never    Sexual activity: Yes     Partners: Female     Birth control/protection: Vasectomy       Family History   Problem Relation Age of Onset    Cancer Mother         Triple X negative breast cancer    Stroke Father         age at diagnosis 67. Passed away at age 72    Other (carotid artery disease) Father     Dementia Father     Bone cancer Paternal Grandfather        Review of Systems   Constitutional:  Negative for chills, fatigue and fever.   Gastrointestinal:  Positive for abdominal  "pain and GERD. Negative for anal bleeding, blood in stool, constipation, diarrhea, nausea, vomiting and indigestion.   Neurological:  Positive for dizziness.           6/17/2025     3:49 PM   PHQ-2/PHQ-9 Depression Screening   Little interest or pleasure in doing things Not at all   Feeling down, depressed, or hopeless Not at all   How difficult have these problems made it for you to do your work, take care of things at home, or get along with other people? Not difficult at all       Fall Risk Screen:  PRICILLA Fall Risk Assessment has not been completed.    Objective       Current Outpatient Medications:     omeprazole (priLOSEC) 20 MG capsule, Take 1 capsule by mouth Daily., Disp: 90 capsule, Rfl: 3    atorvastatin (LIPITOR) 20 MG tablet, Take 1 tablet by mouth Daily., Disp: 30 tablet, Rfl: 11    Vitals:    06/17/25 1549   BP: 132/76   Pulse: 83   Resp: 18   Temp: 97.8 °F (36.6 °C)   TempSrc: Temporal   SpO2: 96%   Weight: 101 kg (221 lb 12.8 oz)   Height: 180.3 cm (70.98\")                  Physical Exam  Vitals and nursing note reviewed.   Constitutional:       General: He is not in acute distress.     Appearance: Normal appearance. He is well-groomed. He is not ill-appearing.   HENT:      Head: Normocephalic and atraumatic.   Eyes:      General: Lids are normal. Vision grossly intact.   Neck:      Vascular: No carotid bruit.   Cardiovascular:      Rate and Rhythm: Normal rate and regular rhythm.      Heart sounds: Normal heart sounds.   Pulmonary:      Effort: Pulmonary effort is normal.      Breath sounds: Normal breath sounds and air entry.   Abdominal:      General: Abdomen is flat. Bowel sounds are normal.      Palpations: Abdomen is soft.      Tenderness: There is no abdominal tenderness.   Musculoskeletal:      Right lower leg: No edema.      Left lower leg: No edema.   Skin:     General: Skin is warm and dry.   Neurological:      Mental Status: He is alert and oriented to person, place, and time. Mental " status is at baseline.   Psychiatric:         Mood and Affect: Mood normal.         Behavior: Behavior normal. Behavior is cooperative.       Derm Physical Exam  Physical Exam  Gastrointestinal: Soft, no tenderness, no distention, no masses    Result Review :     Little interest or pleasure in doing things? Not at all   Feeling down, depressed, or hopeless? Not at all   PHQ-2 Total Score 0        Results  Labs   - LDL cholesterol: Elevated   - HDL cholesterol: Low at 35    Imaging   - Carotid artery disease screening: 10/23/2024, Left carotid artery is normal, right is mild   - Peripheral artery disease screening: 10/23/2024, Normal   - Aortic aneurysm risk screening: 10/23/2024, Normal   - Abdominal aortic aneurysm screening: 10/23/2024, Less than 3 cm    Diagnostic Testing   - Colonoscopy: 2021, One polyp in the hepatic flexure, a 7 mm polyp. Biopsy of some areas of the colon for gastritis, H. pylori was done. Chronic colitis and tubular adenoma were found. No diverticulosis.    Labs:       Imaging:   No valid procedures specified.        Data reviewed:   Data reviewed : [facility] screening results           Plan      Assessment & Plan  Lightheaded         Right-sided carotid artery disease, unspecified type         Lower abdominal pain           Assessment & Plan  1. Dizziness.  - Ongoing dizziness, particularly when transitioning from sitting to standing.  - Blood pressure is well-controlled at home with no episodes of low blood pressure.  - Given family history of stroke and ongoing symptoms, a CT scan of the head is recommended but patient has deferred at this time.  - Advised to start taking 81 mg of baby aspirin daily.    2. Abdominal pain.  - Improvement in abdominal pain with a reduction in coffee and tea intake.  - Currently taking omeprazole for acid reflux, which has not made a significant difference.  - Advised to continue with omeprazole and monitor diet and stress levels.  - Repeat colonoscopy  scheduled for 2026 due to a previous polyp found in 2021.    3. Hyperlipidemia.  - Elevated LDL cholesterol levels and low HDL levels at 35.  - Advised to increase physical activity and incorporate omega-3 fatty acids into diet to improve HDL levels.  - Recommended heart-healthy Mediterranean diet to manage LDL levels.  - Prescription for atorvastatin 20 mg for 30 days provided, with instructions to take at night before bed and avoid grapefruit juice. Recheck of cholesterol levels planned for 6 months from now.       Follow Up   Wrapup Tab  No follow-ups on file.     Patient was given instructions and counseling regarding his condition or for health maintenance advice. Please see specific information pulled into the AVS if appropriate.  Hand hygiene was performed during entrance to exam room and following assessment of patient. This document is intended for medical expert use only.       MARCY St, FNP-C  [address]  [facility]  [address] [place] [address] [address] LISY OVIEDO  [place] [address] [phone]    Patient or patient representative verbalized consent for the use of Ambient Listening during the visit with  MARCY St for chart documentation. 6/17/2025  17:28 EDT

## 2025-06-17 ENCOUNTER — OFFICE VISIT (OUTPATIENT)
Dept: FAMILY MEDICINE CLINIC | Facility: CLINIC | Age: 55
End: 2025-06-17
Payer: COMMERCIAL

## 2025-06-17 VITALS
SYSTOLIC BLOOD PRESSURE: 132 MMHG | OXYGEN SATURATION: 96 % | BODY MASS INDEX: 31.05 KG/M2 | RESPIRATION RATE: 18 BRPM | HEIGHT: 71 IN | TEMPERATURE: 97.8 F | HEART RATE: 83 BPM | WEIGHT: 221.8 LBS | DIASTOLIC BLOOD PRESSURE: 76 MMHG

## 2025-06-17 DIAGNOSIS — R10.30 LOWER ABDOMINAL PAIN: ICD-10-CM

## 2025-06-17 DIAGNOSIS — I77.9 RIGHT-SIDED CAROTID ARTERY DISEASE, UNSPECIFIED TYPE: ICD-10-CM

## 2025-06-17 DIAGNOSIS — R42 LIGHTHEADED: Primary | ICD-10-CM

## 2025-06-17 PROCEDURE — 99214 OFFICE O/P EST MOD 30 MIN: CPT

## 2025-06-17 RX ORDER — ATORVASTATIN CALCIUM 20 MG/1
20 TABLET, FILM COATED ORAL DAILY
Qty: 30 TABLET | Refills: 11 | Status: SHIPPED | OUTPATIENT
Start: 2025-06-17

## (undated) DEVICE — Device

## (undated) DEVICE — ENDOPATH XCEL WITH OPTIVIEW TECHNOLOGY UNIVERSAL TROCAR STABILITY SLEEVES: Brand: ENDOPATH XCEL OPTIVIEW

## (undated) DEVICE — SLV SCD CALF HEMOFORCE DVT THERP REPROC MD

## (undated) DEVICE — ENDOPATH XCEL DILATING TIP TROCARS WITH STABILITY SLEEVES: Brand: ENDOPATH XCEL

## (undated) DEVICE — GLV SURG BIOGEL SENSR LTX PF SZ7.5

## (undated) DEVICE — SOLUTION,WATER,IRRIGATION,1000ML,STERILE: Brand: MEDLINE

## (undated) DEVICE — DRSNG SURESITE WNDW 2.38X2.75

## (undated) DEVICE — ADHS LIQ MASTISOL 2/3ML

## (undated) DEVICE — PENCL EVAC ULTRAVAC SMOKE W/BLD

## (undated) DEVICE — FLTR ULPA W/FLD TRAP

## (undated) DEVICE — GENERAL LAPAROSCOPY CDS: Brand: MEDLINE INDUSTRIES, INC.

## (undated) DEVICE — ENDOPATH PNEUMONEEDLE INSUFFLATION NEEDLES WITH LUER LOCK CONNECTORS 120MM: Brand: ENDOPATH

## (undated) DEVICE — KT SURG TURNOVER 050

## (undated) DEVICE — LAPAROSCOPIC GAS CONDITIONING DEVICE.: Brand: INSUFLOW

## (undated) DEVICE — UNDYED BRAIDED (POLYGLACTIN 910), SYNTHETIC ABSORBABLE SUTURE: Brand: COATED VICRYL

## (undated) DEVICE — BG RETRV TISS SUPERBAG INTRO RIP/STOP NLY 10MM 240ML MD

## (undated) DEVICE — SYR LL TP 10ML STRL

## (undated) DEVICE — ENDOPATH 5MM CURVED SCISSORS WITH MONOPOLAR CAUTERY: Brand: ENDOPATH

## (undated) DEVICE — SUT VIC 0 SUTUPAK TIES 18IN J906G